# Patient Record
Sex: MALE | Race: WHITE | NOT HISPANIC OR LATINO | Employment: FULL TIME | URBAN - METROPOLITAN AREA
[De-identification: names, ages, dates, MRNs, and addresses within clinical notes are randomized per-mention and may not be internally consistent; named-entity substitution may affect disease eponyms.]

---

## 2017-01-02 ENCOUNTER — HOSPITAL ENCOUNTER (EMERGENCY)
Facility: HOSPITAL | Age: 22
Discharge: HOME | End: 2017-01-02
Attending: EMERGENCY MEDICINE
Payer: MEDICAID

## 2017-01-02 VITALS
HEIGHT: 72 IN | WEIGHT: 167 LBS | SYSTOLIC BLOOD PRESSURE: 128 MMHG | BODY MASS INDEX: 22.62 KG/M2 | TEMPERATURE: 98 F | RESPIRATION RATE: 16 BRPM | HEART RATE: 63 BPM | OXYGEN SATURATION: 98 % | DIASTOLIC BLOOD PRESSURE: 64 MMHG

## 2017-01-02 DIAGNOSIS — L02.91 ABSCESS: Primary | ICD-10-CM

## 2017-01-02 PROCEDURE — 99283 EMERGENCY DEPT VISIT LOW MDM: CPT

## 2017-01-02 RX ORDER — SULFAMETHOXAZOLE AND TRIMETHOPRIM 800; 160 MG/1; MG/1
1 TABLET ORAL 2 TIMES DAILY
Qty: 14 TABLET | Refills: 0 | Status: SHIPPED | OUTPATIENT
Start: 2017-01-02 | End: 2017-01-09

## 2017-01-02 NOTE — ED AVS SNAPSHOT
[facility] [address]  [place] [address]               Emilio Gallagher   2017  8:49 PM   ED    Description:  Male : 1995   Department:  [facility]           Your Care was Coordinated By:     Provider Role From To    Anai Wynn MD Attending Provider 17 --    Lynne Santos PA-C Physician Assistant 17 --      Reason for Visit     Abscess           Diagnoses this Visit        Comments    Abscess    -  Primary       ED Disposition     None           To Do List            These Medications        Disp Refills Start End    mupirocin calcium 2% nasl oint (BACTROBAN) 2 % Oint 10 g 0 2017    by Nasal route 2 (two) times daily. - Nasal    sulfamethoxazole-trimethoprim 800-160mg (BACTRIM DS) 800-160 mg Tab 14 tablet 0 2017    Take 1 tablet by mouth 2 (two) times daily. - Oral      [facility]     [facility] Nurse Care Line -  Assistance  Registered nurses in the [facility] provide clinical advisement, health education, appointment booking, and other advisory services.  Call for this free service at [phone].             Medications           Message regarding Medications     Verify the changes and/or additions to your medication regime listed below are the same as discussed with your clinician today.  If any of these changes or additions are incorrect, please notify your healthcare provider.        START taking these NEW medications        Refills    mupirocin calcium 2% nasl oint (BACTROBAN) 2 % Oint 0    Sig: by Nasal route 2 (two) times daily.    Class: Print    Route: Nasal    sulfamethoxazole-trimethoprim 800-160mg (BACTRIM DS) 800-160 mg Tab 0    Sig: Take 1 tablet by mouth 2 (two) times daily.    Class: Print    Route: Oral           Verify that the below list of medications is an accurate representation of the medications you are currently taking.  If  none reported, the list may be blank. If incorrect, please contact your healthcare provider. Carry this list with you in case of emergency.           Current Medications     mupirocin calcium 2% nasl oint (BACTROBAN) 2 % Oint by Nasal route 2 (two) times daily.    sulfamethoxazole-trimethoprim 800-160mg (BACTRIM DS) 800-160 mg Tab Take 1 tablet by mouth 2 (two) times daily.           Clinical Reference Information           Your Vitals Were     BP Pulse Temp Resp Height Weight    140/66 (BP Location: Right arm, Patient Position: Sitting) 89 98.8 °F (37.1 °C) (Oral) 20 6' (1.829 m) 75.8 kg (167 lb)    SpO2 BMI             98% 22.65 kg/m2         Allergies as of 1/2/2017     No Known Allergies      Immunizations Administered on Date of Encounter - 1/2/2017     None      ED Micro, Lab, POCT     None      ED Imaging Orders     None        Discharge Instructions         Abscess (Antibiotic Treatment Only)  An abscess (sometimes called a boil) occurs when bacteria get trapped under the skin and begin to grow. Pus forms inside the abscess as the body responds to the bacteria. An abscess can occur with an insect bite, ingrown hair, blocked oil gland, pimple, cyst, or puncture wound.  In the early stages, redness and tenderness are the only symptoms. Sometimes, this stage can be treated with antibiotics alone. If the abscess does not respond to antibiotic treatment, it will need to be drained with a small cut, under local anesthesia.  Home care  The following will help you care for your abscess at home:  · Soak the wound in hot water or apply hot packs (small towel soaked in hot water) to the area for 20 minutes at a time. Do this 3 to 4 times a day.  · Do not willy, squeeze, or pop the boil yourself.  · Apply antibiotic cream or ointment onto the skin 3 to 4 times a day, unless something else was prescribed. Some ointments include an antibiotic plus a local pain reliever.  · If your doctor prescribed antibiotics, do not  stop taking this medication until you have finished the medication or the doctor tells you to stop.  · You may use an over-the-counter pain medication to control pain, unless another pain medicine was prescribed. If you have chronic liver or kidney disease or ever had a stomach ulcer or gastrointestinal bleeding, talk with your doctor before using these any of these.  Follow-up care  Follow up with your health care provider as advised by our staff. Look at your wound each day for the signs of worsening infection listed below.  When to seek medical care  Get prompt medical attention if any of the following occur:  · An increase in redness or swelling  · Red streaks in the skin leading away from the abscess  · An increase in local pain or swelling  · Fever of 100.4ºF (38ºC) or higher, or as directed by your health care provider  · Pus or fluid coming from the abscess  · Boil returns after getting better  © 3120-1198 [employer]. [address] [place] [address]. All rights reserved. This information is not intended as a substitute for professional medical care. Always follow your healthcare professional's instructions.          [facility] Sign-Up     Activating your [facility] account is as easy as 1-2-3!     1) Visit [email or web address], select Sign Up Now, enter this activation code and your date of birth, then select Next.  [email or web address]  Expires: 2/16/2017  9:35 PM      2) Create a username and password to use when you visit [facility] in the future and select a security question in case you lose your password and select Next.    3) Enter your e-mail address and click Sign Up!    Additional Information  If you have questions, please e-mail [email or web address] or call [phone] to talk to our [facility] staff. Remember, [facility] is NOT to be used for urgent needs. For medical emergencies, dial 911.         Smoking Cessation     If you would like to quit smoking:   You may be eligible for  free services if you are a [place] resident and started smoking cigarettes before September 1, 1988.  Call the Smoking Cessation Trust (SCT) toll free at (410) 866-7262 or (250) 919-3114.   Call 3-800-QUIT-NOW if you do not meet the above criteria.             [facility] complies with applicable Federal civil rights laws and does not discriminate on the basis of race, color, national origin, age, disability, or sex.        Language Assistance Services     ATTENTION: Language assistance services are available, free of charge. Please call [phone].      ATENCIÓN: Si habla español, tiene a diamond disposición servicios gratuitos de asistencia lingüística. Llame al [phone].     CHÚ Ý: N?u b?n nói Ti?ng Vi?t, có các d?ch v? h? tr? ngôn ng? mi?n phí dành cho b?n. G?i s? [phone].

## 2017-01-03 NOTE — ED PROVIDER NOTES
Encounter Date: 1/2/2017       History     Chief Complaint   Patient presents with    Abscess     noted to left upper leg x 1.5 days      Review of patient's allergies indicates:  No Known Allergies  HPI Comments: Emilio Gallagher, a 21 y.o. male that presents to the ED for abscess to L upper thigh.  He noticed it about 2 days ago and believe it started as an ingrown hair.  He describes the pain as dull and worse with touch.  He states that he was able to express some purulent material last night and since that time the area has seen improved pain, swelling and redness.  He has developed about 8 abscesses over the course of the last two years and generally is able to self treat at home with warm compresses, but is here on the advice of his RN girlfriend to be evaluated.        Patient is a 21 y.o. male presenting with the following complaint: abscess. The history is provided by the patient.   Abscess    This is a new problem. The current episode started two days ago. The problem occurs continuously. The problem has been gradually improving. The abscess is present on the left upper leg. The pain is at a severity of 5/10. The abscess is characterized by redness, draining and swelling. Pertinent negatives include no fever, no diarrhea and no vomiting. There were no sick contacts. He has received no recent medical care.     History reviewed. No pertinent past medical history.  No past medical history pertinent negatives.  Past Surgical History   Procedure Laterality Date    Appendectomy       History reviewed. No pertinent family history.  Social History   Substance Use Topics    Smoking status: Current Every Day Smoker    Smokeless tobacco: None    Alcohol use No     Review of Systems   Constitutional: Negative for fever.   Gastrointestinal: Negative for diarrhea and vomiting.   Musculoskeletal: Negative for gait problem and joint swelling.   Skin: Positive for color change.   Allergic/Immunologic: Negative for  immunocompromised state.   Neurological: Negative for speech difficulty.   Hematological: Does not bruise/bleed easily.   Psychiatric/Behavioral: Negative for agitation and confusion.   All other systems reviewed and are negative.      Physical Exam   Initial Vitals   BP Pulse Resp Temp SpO2   01/02/17 2013 01/02/17 2013 01/02/17 2013 01/02/17 2013 01/02/17 2013   140/66 89 20 98.8 °F (37.1 °C) 98 %     Physical Exam    Nursing note and vitals reviewed.  Constitutional: He appears well-developed and well-nourished.   HENT:   Head: Normocephalic and atraumatic.   Right Ear: External ear normal.   Left Ear: External ear normal.   Nose: Nose normal.   Mouth/Throat: Oropharynx is clear and moist.   Eyes: EOM are normal.   Neck: Normal range of motion. Neck supple.   Cardiovascular: Normal rate and regular rhythm.   Pulmonary/Chest: Breath sounds normal. No respiratory distress.   Abdominal: Soft. He exhibits no distension. There is no tenderness. There is no rebound.   Musculoskeletal: Normal range of motion. He exhibits no edema or tenderness.   Lymphadenopathy:     He has no cervical adenopathy.   Neurological: He is alert and oriented to person, place, and time. No cranial nerve deficit.   Skin: Skin is warm and dry. Abscess noted. No rash noted. No erythema.        Psychiatric: He has a normal mood and affect. Thought content normal.         ED Course   Procedures  Labs Reviewed - No data to display          Medical Decision Making:   Initial Assessment:   Emilio Gallagher, a 21 y.o. male that presents to the ED for abscess to L upper thigh.  He noticed it about 2 days ago and believe it started as an ingrown hair.  He describes the pain as dull and worse with touch.  He states that he was able to express some purulent material last night and since that time the area has seen improved pain, swelling and redness.  He has developed about 8 abscesses over the course of the last two years and generally is able to self  treat at home with warm compresses, but is here on the advice of his RN girlfriend to be evaluated.    Differential Diagnosis:   Abscess, cellulitis   ED Management:  Patient states that the area is improved since his own expression of purulent material last night.  No fluctuance noted on exam to warrant I&D at this time.  Instructed to continue with warm compresses.  Will treat with bactrim.  Given information on use of Mupirocin intranasally and Hibiclens x 1.  Given strict precautions for return to ED and verbalized understanding.  RX: mupirocin, bactrim               Attending Attestation:     Physician Attestation Statement for NP/PA:   I have conducted a face to face encounter with this patient in addition to the NP/PA, due to NP/PA Request    Other NP/PA Attestation Additions:    History of Present Illness: Left thigh abscess, hx of abscess, expressed pus last night and now it is improved    Medical Decision Making: No fluctuance to warrant I&D in ER.  Pus expressed last night.  Will treat with bactrim.                 ED Course     Clinical Impression:   There were no encounter diagnoses.               Lynne Santos PA-C  01/02/17 2148       Anai Wynn MD  01/19/17 1112       Anai Wynn MD  01/19/17 1169

## 2017-01-03 NOTE — DISCHARGE INSTRUCTIONS
Abscess (Antibiotic Treatment Only)  An abscess (sometimes called a boil) occurs when bacteria get trapped under the skin and begin to grow. Pus forms inside the abscess as the body responds to the bacteria. An abscess can occur with an insect bite, ingrown hair, blocked oil gland, pimple, cyst, or puncture wound.  In the early stages, redness and tenderness are the only symptoms. Sometimes, this stage can be treated with antibiotics alone. If the abscess does not respond to antibiotic treatment, it will need to be drained with a small cut, under local anesthesia.  Home care  The following will help you care for your abscess at home:  · Soak the wound in hot water or apply hot packs (small towel soaked in hot water) to the area for 20 minutes at a time. Do this 3 to 4 times a day.  · Do not willy, squeeze, or pop the boil yourself.  · Apply antibiotic cream or ointment onto the skin 3 to 4 times a day, unless something else was prescribed. Some ointments include an antibiotic plus a local pain reliever.  · If your doctor prescribed antibiotics, do not stop taking this medication until you have finished the medication or the doctor tells you to stop.  · You may use an over-the-counter pain medication to control pain, unless another pain medicine was prescribed. If you have chronic liver or kidney disease or ever had a stomach ulcer or gastrointestinal bleeding, talk with your doctor before using these any of these.  Follow-up care  Follow up with your health care provider as advised by our staff. Look at your wound each day for the signs of worsening infection listed below.  When to seek medical care  Get prompt medical attention if any of the following occur:  · An increase in redness or swelling  · Red streaks in the skin leading away from the abscess  · An increase in local pain or swelling  · Fever of 100.4ºF (38ºC) or higher, or as directed by your health care provider  · Pus or fluid coming from the  abscess  · Boil returns after getting better  © 8556-5143 [employer]. [address] [place] [address], [place] [address]. All rights reserved. This information is not intended as a substitute for professional medical care. Always follow your healthcare professional's instructions.

## 2017-01-03 NOTE — ED NOTES
"Abscess to left thigh x 1 day.  Pt "popped" it last pm and it has been draining fluid since.  No fever  "

## 2017-05-10 ENCOUNTER — HOSPITAL ENCOUNTER (EMERGENCY)
Facility: HOSPITAL | Age: 22
Discharge: HOME | End: 2017-05-11
Attending: EMERGENCY MEDICINE
Payer: MEDICAID

## 2017-05-10 DIAGNOSIS — B34.9 VIRAL SYNDROME: Primary | ICD-10-CM

## 2017-05-10 LAB
ANISOCYTOSIS BLD QL SMEAR: SLIGHT
BASOPHILS # BLD AUTO: 0.07 K/UL
BASOPHILS NFR BLD: 0.8 %
DEPRECATED S PYO AG THROAT QL EIA: NEGATIVE
DIFFERENTIAL METHOD: ABNORMAL
EOSINOPHIL # BLD AUTO: 0.3 K/UL
EOSINOPHIL NFR BLD: 2.9 %
ERYTHROCYTE [DISTWIDTH] IN BLOOD BY AUTOMATED COUNT: 14.9 %
HCT VFR BLD AUTO: 41.7 %
HGB BLD-MCNC: 14 G/DL
LYMPHOCYTES # BLD AUTO: 3.2 K/UL
LYMPHOCYTES NFR BLD: 36.1 %
MCH RBC QN AUTO: 19.9 PG
MCHC RBC AUTO-ENTMCNC: 33.6 %
MCV RBC AUTO: 59 FL
MONOCYTES # BLD AUTO: 0.6 K/UL
MONOCYTES NFR BLD: 7.1 %
NEUTROPHILS # BLD AUTO: 4.6 K/UL
NEUTROPHILS NFR BLD: 53.1 %
PLATELET # BLD AUTO: 232 K/UL
PLATELET BLD QL SMEAR: ABNORMAL
PMV BLD AUTO: 9.5 FL
RBC # BLD AUTO: 7.02 M/UL
WBC # BLD AUTO: 8.77 K/UL

## 2017-05-10 PROCEDURE — 85025 COMPLETE CBC W/AUTO DIFF WBC: CPT

## 2017-05-10 PROCEDURE — 80053 COMPREHEN METABOLIC PANEL: CPT

## 2017-05-10 PROCEDURE — 87081 CULTURE SCREEN ONLY: CPT

## 2017-05-10 PROCEDURE — 99283 EMERGENCY DEPT VISIT LOW MDM: CPT

## 2017-05-10 PROCEDURE — 86308 HETEROPHILE ANTIBODY SCREEN: CPT

## 2017-05-10 PROCEDURE — 87880 STREP A ASSAY W/OPTIC: CPT

## 2017-05-10 NOTE — ED AVS SNAPSHOT
[facility] [address]  [place] [address]               Emilio Gallagher   5/10/2017 10:13 PM   ED    Description:  Male : 1995   Department:  [facility]           Your Care was Coordinated By:     Provider Role From To    Kassy Wilhelm MD Attending Provider 05/10/17 9876 --      Reason for Visit     Fatigue           Diagnoses this Visit        Comments    Viral syndrome    -  Primary       ED Disposition     ED Disposition Condition Comment    Discharge             To Do List           Follow-up Information     Please follow up.    Why:  [facility] (Tel: [phone])      [facility]     [facility] Care [facility] -  Assistance  Unless otherwise directed by your provider, please contact [facility], our nurse care line that is available for  assistance.     Registered nurses in the [facility] provide: appointment scheduling, clinical advisement, health education, and other advisory services.  Call: [phone] (toll free)               Medications           Message regarding Medications     Verify the changes and/or additions to your medication regime listed below are the same as discussed with your clinician today.  If any of these changes or additions are incorrect, please notify your healthcare provider.             Verify that the below list of medications is an accurate representation of the medications you are currently taking.  If none reported, the list may be blank. If incorrect, please contact your healthcare provider. Carry this list with you in case of emergency.                Clinical Reference Information           Your Vitals Were     BP Pulse Temp Resp Height Weight    135/78 (BP Location: Right arm, Patient Position: Lying, BP Method: Automatic) 86 99 °F (37.2 °C) 16 6' (1.829 m) 73.9 kg (163 lb)    SpO2 BMI             99% 22.11 kg/m2         Allergies as of 2017   "   No Known Allergies      Immunizations Administered on Date of Encounter - 5/11/2017     None      ED Micro, Lab, POCT     Start Ordered       Status Ordering Provider    05/10/17 2256 05/10/17 2255  Rapid strep screen  STAT      Final result     05/10/17 2255 05/10/17 2255  CBC auto differential  STAT      Final result     05/10/17 2255 05/10/17 2255  Comprehensive metabolic panel  STAT      Final result     05/10/17 2255 05/10/17 2255  Strep A culture, throat  Once      In process     05/10/17 2254 05/10/17 2255  Heterophile Ab Screen  Once      Final result       ED Imaging Orders     None        Discharge Instructions         Viral Syndrome (Adult)  A viral illness may cause a number of symptoms. The symptoms depend on the part of the body that the virus affects. If it settles in your nose, throat, and lungs, it may cause cough, sore throat, congestion, and sometimes headache. If it settles in your stomach and intestinal tract, it may cause vomiting and diarrhea. Sometimes it causes vague symptoms like "aching all over," feeling tired, loss of appetite, or fever.  A viral illness usually lasts 1 to 2 weeks, but sometimes it lasts longer. In some cases, a more serious infection can look like a viral syndrome in the first few days of the illness. You may need another exam and additional tests to know the difference. Watch for the warning signs listed below.  Home care  Follow these guidelines for taking care of yourself at home:  · If symptoms are severe, rest at home for the first 2 to 3 days.  · Stay away from cigarette smoke - both your smoke and the smoke from others.  · You may use over-the-counter acetaminophen or ibuprofen for fever, muscle aching, and headache, unless another medicine was prescribed for this. If you have chronic liver or kidney disease or ever had a stomach ulcer or GI bleeding, talk with your doctor before using these medicines. No one who is younger than 18 and ill with a fever should " take aspirin. It may cause severe disease or death.  · Your appetite may be poor, so a light diet is fine. Avoid dehydration by drinking 8 to 12 8-ounce glasses of fluids each day. This may include water; orange juice; lemonade; apple, grape, and cranberry juice; clear fruit drinks; electrolyte replacement and sports drinks; and decaffeinated teas and coffee. If you have been diagnosed with a kidney disease, ask your doctor how much and what types of fluids you should drink to prevent dehydration. If you have kidney disease, drinking too much fluid can cause it build up in the your body and be dangerous to your health.  · Over-the-counter remedies won't shorten the length of the illness but may be helpful for cough, sore throat; and nasal and sinus congestion. Don't use decongestants if you have high blood pressure.  Follow-up care  Follow up with your healthcare provider if you do not improve over the next week.  Call 911  Get emergency medical care if any of the following occur:  · Convulsion  · Feeling weak, dizzy, or like you are going to faint  · Chest pain, shortness of breath, wheezing, or difficulty breathing  When to seek medical advice  Call your healthcare provider right away if any of these occur:  · Cough with lots of colored sputum (mucus) or blood in your sputum  · Chest pain, shortness of breath, wheezing, or difficulty breathing  · Severe headache; face, neck, or ear pain  · Severe, constant pain in the lower right side of your belly (abdominal)  · Continued vomiting (cant keep liquids down)  · Frequent diarrhea (more than 5 times a day); blood (red or black color) or mucus in diarrhea  · Feeling weak, dizzy, or like you are going to faint  · Extreme thirst  · Fever of 100.4°F (38°C) or higher, or as directed by your healthcare provider  Date Last Reviewed: 9/25/2015 © 2000-2016 [employer]. [address] [place] [address], [place] [address]. All rights reserved. This information is not  intended as a substitute for professional medical care. Always follow your healthcare professional's instructions.          [facility] Sign-Up     Activating your [facility] account is as easy as 1-2-3!     1) Visit [email or web address], select Sign Up Now, enter this activation code and your date of birth, then select Next.  [email or web address]  Expires: 6/25/2017 12:58 AM      2) Create a username and password to use when you visit [facility] in the future and select a security question in case you lose your password and select Next.    3) Enter your e-mail address and click Sign Up!    Additional Information  If you have questions, please e-mail [email or web address] or call [phone] to talk to our [facility] staff. Remember, [facility] is NOT to be used for urgent needs. For medical emergencies, dial 911.         Smoking Cessation     If you would like to quit smoking:   You may be eligible for free services if you are a [place] resident and started smoking cigarettes before September 1, 1988.  Call the Smoking Cessation Trust ([facility]) toll free at (633) 317-0319 or (252) 666-5427.   Call 0-800-QUIT-NOW if you do not meet the above criteria.   Contact us via email: [email or web address]   View our website for more information: [email or web address]         [facility] complies with applicable Federal civil rights laws and does not discriminate on the basis of race, color, national origin, age, disability, or sex.        Language Assistance Services     ATTENTION: Language assistance services are available, free of charge. Please call [phone].      ATENCIÓN: Si habla español, tiene a diamond disposición servicios gratuitos de asistencia lingüística. Llame al [phone].     CHÚ Ý: N?u b?n nói Ti?ng Vi?t, có các d?ch v? h? tr? ngôn ng? mi?n phí dành cho b?n. G?i s? [phone].

## 2017-05-11 VITALS
OXYGEN SATURATION: 100 % | BODY MASS INDEX: 22.08 KG/M2 | TEMPERATURE: 99 F | RESPIRATION RATE: 16 BRPM | SYSTOLIC BLOOD PRESSURE: 131 MMHG | WEIGHT: 163 LBS | HEART RATE: 86 BPM | HEIGHT: 72 IN | DIASTOLIC BLOOD PRESSURE: 77 MMHG

## 2017-05-11 LAB
ALBUMIN SERPL BCP-MCNC: 4.4 G/DL
ALP SERPL-CCNC: 52 U/L
ALT SERPL W/O P-5'-P-CCNC: 42 U/L
ANION GAP SERPL CALC-SCNC: 7 MMOL/L
AST SERPL-CCNC: 22 U/L
BILIRUB SERPL-MCNC: 0.9 MG/DL
BUN SERPL-MCNC: 21 MG/DL
CALCIUM SERPL-MCNC: 9.4 MG/DL
CHLORIDE SERPL-SCNC: 103 MMOL/L
CO2 SERPL-SCNC: 27 MMOL/L
CREAT SERPL-MCNC: 1.4 MG/DL
EST. GFR  (AFRICAN AMERICAN): >60 ML/MIN/1.73 M^2
EST. GFR  (NON AFRICAN AMERICAN): >60 ML/MIN/1.73 M^2
GLUCOSE SERPL-MCNC: 94 MG/DL
HETEROPH AB SERPL QL IA: NEGATIVE
POTASSIUM SERPL-SCNC: 4 MMOL/L
PROT SERPL-MCNC: 7.6 G/DL
SODIUM SERPL-SCNC: 137 MMOL/L

## 2017-05-11 NOTE — ED PROVIDER NOTES
Encounter Date: 5/10/2017       History     Chief Complaint   Patient presents with    Fatigue     Treated with unknown daily antibiotic x 7 days for diagnosed tonsillitis - last antibiotic taken today. Continues with generalized weakness, cough, fever, and headache. No N/V. Presents awake, alert, oriented.      Review of patient's allergies indicates:  No Known Allergies  HPI Comments: The patient presents emergency department with fever heartburn headache and fatigue.  The patient states he still has a persistent mild sore throat as well.  The patient went to an urgent care last week and was given a prescription for Ceftin ear which she has completed.  He states he still doesn't feel better and he still running a fever.  The patient was told at urgent care that he had tonsillitis.    The history is provided by the patient and a relative.     History reviewed. No pertinent past medical history.  Past Surgical History:   Procedure Laterality Date    APPENDECTOMY       History reviewed. No pertinent family history.  Social History   Substance Use Topics    Smoking status: Current Every Day Smoker    Smokeless tobacco: None    Alcohol use No     Review of Systems   Constitutional: Positive for activity change, appetite change, fatigue and fever.   HENT: Positive for sore throat. Negative for sinus pressure.    Respiratory: Negative for cough and shortness of breath.    Cardiovascular: Negative for chest pain.   Gastrointestinal: Negative for abdominal pain, diarrhea, nausea and vomiting.   Genitourinary: Negative for dysuria, flank pain and frequency.   Musculoskeletal: Negative for back pain, myalgias, neck pain and neck stiffness.   Skin: Negative for rash.   Neurological: Positive for weakness. Negative for headaches.   Hematological: Does not bruise/bleed easily.   Psychiatric/Behavioral: Negative for confusion.       Physical Exam   Initial Vitals   BP Pulse Resp Temp SpO2   05/10/17 2036 05/10/17 2036  05/10/17 2036 05/10/17 2036 05/10/17 2036   132/76 99 16 98.5 °F (36.9 °C) 99 %     Physical Exam    Nursing note and vitals reviewed.  Constitutional: He appears well-developed and well-nourished.   HENT:   Head: Normocephalic and atraumatic.   Right Ear: Tympanic membrane is injected and erythematous.   Left Ear: Tympanic membrane is injected and erythematous.   Mouth/Throat: Oropharynx is clear and moist.   Eyes: EOM are normal. Pupils are equal, round, and reactive to light.   Neck: Normal range of motion. Neck supple.   Cardiovascular: Normal rate, regular rhythm, normal heart sounds and intact distal pulses.   Pulmonary/Chest: Breath sounds normal.   Abdominal: Soft. Bowel sounds are normal. He exhibits no distension. There is no tenderness. There is no rebound and no guarding.   Musculoskeletal: Normal range of motion. He exhibits no edema.   Neurological: He is alert and oriented to person, place, and time.   Skin: Skin is warm and dry.   Psychiatric: He has a normal mood and affect. His behavior is normal. Judgment and thought content normal.         ED Course   Procedures  Labs Reviewed   THROAT SCREEN, RAPID   HETEROPHILE AB SCREEN   CBC W/ AUTO DIFFERENTIAL   COMPREHENSIVE METABOLIC PANEL             Medical Decision Making:   Clinical Tests:   Lab Tests: Ordered and Reviewed  The following lab test(s) were unremarkable: CBC and CMP       <> Summary of Lab: Monospot, strep  ED Management:  I discussed the results of the patient's labs with him.  The patient was on Cefdinir for a week.  This should treat strep and otitis media if bacterial.  I discussed starting the patient on a different regimen of antibiotics and he prefers to wait and see if he feels better in a few days.  I recommend that he drinks lots of liquids and take vitamins.                   ED Course     Clinical Impression:   The encounter diagnosis was Viral syndrome.          Kassy Wilhelm MD  05/11/17 0059

## 2017-05-11 NOTE — DISCHARGE INSTRUCTIONS
"  Viral Syndrome (Adult)  A viral illness may cause a number of symptoms. The symptoms depend on the part of the body that the virus affects. If it settles in your nose, throat, and lungs, it may cause cough, sore throat, congestion, and sometimes headache. If it settles in your stomach and intestinal tract, it may cause vomiting and diarrhea. Sometimes it causes vague symptoms like "aching all over," feeling tired, loss of appetite, or fever.  A viral illness usually lasts 1 to 2 weeks, but sometimes it lasts longer. In some cases, a more serious infection can look like a viral syndrome in the first few days of the illness. You may need another exam and additional tests to know the difference. Watch for the warning signs listed below.  Home care  Follow these guidelines for taking care of yourself at home:  · If symptoms are severe, rest at home for the first 2 to 3 days.  · Stay away from cigarette smoke - both your smoke and the smoke from others.  · You may use over-the-counter acetaminophen or ibuprofen for fever, muscle aching, and headache, unless another medicine was prescribed for this. If you have chronic liver or kidney disease or ever had a stomach ulcer or GI bleeding, talk with your doctor before using these medicines. No one who is younger than 18 and ill with a fever should take aspirin. It may cause severe disease or death.  · Your appetite may be poor, so a light diet is fine. Avoid dehydration by drinking 8 to 12 8-ounce glasses of fluids each day. This may include water; orange juice; lemonade; apple, grape, and cranberry juice; clear fruit drinks; electrolyte replacement and sports drinks; and decaffeinated teas and coffee. If you have been diagnosed with a kidney disease, ask your doctor how much and what types of fluids you should drink to prevent dehydration. If you have kidney disease, drinking too much fluid can cause it build up in the your body and be dangerous to your " health.  · Over-the-counter remedies won't shorten the length of the illness but may be helpful for cough, sore throat; and nasal and sinus congestion. Don't use decongestants if you have high blood pressure.  Follow-up care  Follow up with your healthcare provider if you do not improve over the next week.  Call 911  Get emergency medical care if any of the following occur:  · Convulsion  · Feeling weak, dizzy, or like you are going to faint  · Chest pain, shortness of breath, wheezing, or difficulty breathing  When to seek medical advice  Call your healthcare provider right away if any of these occur:  · Cough with lots of colored sputum (mucus) or blood in your sputum  · Chest pain, shortness of breath, wheezing, or difficulty breathing  · Severe headache; face, neck, or ear pain  · Severe, constant pain in the lower right side of your belly (abdominal)  · Continued vomiting (cant keep liquids down)  · Frequent diarrhea (more than 5 times a day); blood (red or black color) or mucus in diarrhea  · Feeling weak, dizzy, or like you are going to faint  · Extreme thirst  · Fever of 100.4°F (38°C) or higher, or as directed by your healthcare provider  Date Last Reviewed: 9/25/2015  © 2065-8233 [employer]. [address] [place] [address], [place] [address]. All rights reserved. This information is not intended as a substitute for professional medical care. Always follow your healthcare professional's instructions.

## 2017-05-13 LAB — BACTERIA THROAT CULT: NORMAL

## 2017-11-15 ENCOUNTER — OFFICE VISIT (OUTPATIENT)
Dept: URGENT CARE | Facility: CLINIC | Age: 22
End: 2017-11-15
Payer: MEDICAID

## 2017-11-15 VITALS
DIASTOLIC BLOOD PRESSURE: 80 MMHG | OXYGEN SATURATION: 99 % | RESPIRATION RATE: 18 BRPM | HEART RATE: 80 BPM | WEIGHT: 165 LBS | SYSTOLIC BLOOD PRESSURE: 125 MMHG | HEIGHT: 70 IN | TEMPERATURE: 98 F | BODY MASS INDEX: 23.62 KG/M2

## 2017-11-15 DIAGNOSIS — L23.7 POISON IVY DERMATITIS: Primary | ICD-10-CM

## 2017-11-15 DIAGNOSIS — H00.021 HORDEOLUM INTERNUM OF RIGHT UPPER EYELID: ICD-10-CM

## 2017-11-15 PROCEDURE — 99203 OFFICE O/P NEW LOW 30 MIN: CPT | Mod: S$GLB,,, | Performed by: PHYSICIAN ASSISTANT

## 2017-11-15 RX ORDER — METHYLPREDNISOLONE 4 MG/1
4 TABLET ORAL
Qty: 1 PACKAGE | Refills: 0 | Status: SHIPPED | OUTPATIENT
Start: 2017-11-15 | End: 2017-11-21

## 2017-11-15 RX ORDER — CLOBETASOL PROPIONATE 0.5 MG/G
CREAM TOPICAL 2 TIMES DAILY
Qty: 60 G | Refills: 1 | Status: SHIPPED | OUTPATIENT
Start: 2017-11-15 | End: 2017-11-25

## 2017-11-15 RX ORDER — NEOMYCIN/POLYMYXIN B/HYDROCORT 3.5-10K-1
2 SUSPENSION, DROPS(FINAL DOSAGE FORM)(ML) OPHTHALMIC (EYE) EVERY 4 HOURS
Qty: 7.5 ML | Refills: 0 | Status: SHIPPED | OUTPATIENT
Start: 2017-11-15

## 2017-11-15 NOTE — PATIENT INSTRUCTIONS
Managing a Poison Ivy, Poison Oak, or Poison Sumac Reaction  If you come in contact with urushiol    If you think you may have come in contact with the sap oil (called urushiol) contained in poison ivy, poison oak, or poison sumac plants, wash the affected part of your skin. Do this within 15 minutes after contact. Use water or preferably, soap and water.  Undress, and wash your clothes and gear as soon as you can. Be sure to wash any pet that was with you. Taking these steps can help prevent spreading sap oil to someone else. If you have a rash, but are not sure if it is from one of these plants, see your healthcare provider.  To soothe the itching  Your skin may react to poison oak, poison ivy, and poison sumac within hours to a few days after contact. Once you have come into contact with these plants, you cant stop the reaction. But you can take these steps to soothe the itching:  · Dont scratch or scrub your rash. Not even if the itching is severe. Scratching can lead to infection.  · Bathe in lukewarm (not hot) water. Or take short cool showers to relieve the itching. For a more soothing bath, add oatmeal to the water.  · Use antihistamines that are taken by mouth. These include diphenhydramine. You can buy these at the pharmacy. Talk to your healthcare provider or pharmacist for more information on oral antihistamines.  · Use over-the-counter treatments on your skin. These include cortisone and calamine lotion.  How your skin may react  A mild rash may become red, swollen, and itchy. The rash may form a line on your skin where you brushed against the plant. If you have a severe rash, your itching may worsen. And your rash may blister and ooze. If this happens, seek medical care. The fluid from your blisters will not make your rash spread. With or without medical care, your rash may last up to 3 weeks. In the future, try to avoid coming in contact with these plants.  When to call your healthcare  provider  Call your healthcare provider if:  · Your rash is severe  · The rash spreads beyond the exposed part of your body or affects your face.  · The rash does not clear up within a few weeks  You may be given medicine to take by mouth or apply directly on the skin.  Call 911  Call 911 if you have any of the following:  · Trouble breathing or swallowing  · Any significant swelling  Date Last Reviewed: 3/1/2017  © 6751-9042 [employer]. [address] [place] [address], [place] [address]. All rights reserved. This information is not intended as a substitute for professional medical care. Always follow your healthcare professional's instructions.        Sty (or Stye)  A sty is an infection of the oil gland of the eyelid. It may develop into a small pocket of pus (abscess). This can cause pain, redness, and swelling. In early stages, styes are treated with antibiotic cream, eye drops, or warm packs (small towels soaked in warm water). More severe cases may need to be opened and drained by a health care provider.  Home care  · Eye drops or ointment are usually prescribed to treat the infection. Use these as directed.   ¨ Artificial tears may also be used to lubricate the eye and make it more comfortable. These may be purchased without a prescription.   ¨ Talk to your health care provider before using any over-the-counter treatment for a sty.  · Apply a warm, damp towel to the affected eye for at least 5 minutes, 3 to 4 times a day for a week. Warm compresses open the pores and speed the healing. If the compresses are too hot, they may burn your eyelid.  · Sometimes the sty will drain with this treatment alone. If this happens, continue the antibiotic until all the redness and swelling are gone.  · Wash your hands before and after touching the infected eye to avoid spreading the infection.  · Do not squeeze or try to puncture the sty.  Follow-up care  Follow up with your health care provider, or as advised.   When  to seek medical advice  Call your health care provider right away if you have:  · Increase in swelling or redness around the eyelid after 48 to 72 hours  · Increase in eye pain or the eyelid blisters  · Increase in warmth--the eyelid feels hot  · Drainage of blood or thick pus from the sty  · Blister on the eyelid  · Inability to open the eyelid due to swelling  · Fever  ¨ 1 degree above your normal temperature lasting for 24 to 48 hours, or  ¨ Whatever your health care provider told you to report based on your medical condition  · Vision changes  · Headache or stiff neck  · Recurrence of the sty  Date Last Reviewed: 6/14/2015  © 8196-0965 [employer]. [address] [place] [address], [place] [address]. All rights reserved. This information is not intended as a substitute for professional medical care. Always follow your healthcare professional's instructions.      Please follow up with your Primary care provider within 2-5 days if your signs and symptoms have not resolved or worsen.     If your condition worsens or fails to improve we recommend that you receive another evaluation at the emergency room immediately or contact your primary medical clinic to discuss your concerns.   You must understand that you have received an Urgent Care treatment only and that you may be released before all of your medical problems are known or treated. You, the patient, will arrange for follow up care as instructed.

## 2017-11-15 NOTE — PROGRESS NOTES
"Subjective:       Patient ID: Emilio Gallagher is a 22 y.o. male.    Vitals:  height is 5' 10" (1.778 m) and weight is 74.8 kg (165 lb). His oral temperature is 97.5 °F (36.4 °C). His blood pressure is 125/80 and his pulse is 80. His respiration is 18 and oxygen saturation is 99%.     Chief Complaint: Rash    Rash   This is a new problem. The current episode started in the past 7 days. The rash is characterized by itchiness and redness. It is unknown if there was an exposure to a precipitant. Pertinent negatives include no fever, joint pain, shortness of breath or sore throat.   Eye Problem    The right eye is affected. This is a new problem. The current episode started in the past 7 days. The injury mechanism is unknown. The pain is at a severity of 0/10. The patient is experiencing no pain. There is no known exposure to pink eye. He does not wear contacts. Pertinent negatives include no fever. He has tried nothing for the symptoms.     Review of Systems   Constitution: Negative for chills and fever.   HENT: Negative for sore throat.    Respiratory: Negative for shortness of breath.    Skin: Positive for itching and rash.   Musculoskeletal: Negative for joint pain.       Objective:      Physical Exam   Constitutional: He is oriented to person, place, and time. He appears well-developed and well-nourished. He is cooperative.  Non-toxic appearance. He does not appear ill. No distress.   HENT:   Head: Normocephalic and atraumatic. Head is without abrasion, without contusion and without laceration.   Right Ear: Hearing, tympanic membrane, external ear and ear canal normal.   Left Ear: Hearing, tympanic membrane, external ear and ear canal normal.   Nose: Nose normal. No mucosal edema, rhinorrhea or nasal deformity. No epistaxis. Right sinus exhibits no maxillary sinus tenderness and no frontal sinus tenderness. Left sinus exhibits no maxillary sinus tenderness and no frontal sinus tenderness.   Mouth/Throat: Uvula is " midline, oropharynx is clear and moist and mucous membranes are normal. No trismus in the jaw. Normal dentition. No uvula swelling. No posterior oropharyngeal erythema.   Eyes: Conjunctivae, EOM and lids are normal. Pupils are equal, round, and reactive to light. Lids are everted and swept, no foreign bodies found. Right eye exhibits hordeolum. Right eye exhibits no chemosis, no discharge and no exudate. No foreign body present in the right eye. Left eye exhibits no chemosis, no discharge, no exudate and no hordeolum. No foreign body present in the left eye. No scleral icterus.       Sclera clear bilat   Neck: Trachea normal, full passive range of motion without pain and phonation normal. Neck supple.   Cardiovascular: Normal rate, regular rhythm, normal heart sounds, intact distal pulses and normal pulses.    Pulmonary/Chest: Effort normal and breath sounds normal. No stridor. No respiratory distress.   Abdominal: Soft. Normal appearance and bowel sounds are normal. He exhibits no distension. There is no tenderness.   Musculoskeletal: Normal range of motion. He exhibits no edema or deformity.   Neurological: He is alert and oriented to person, place, and time. He exhibits normal muscle tone. Coordination normal.   Skin: Skin is warm, dry and intact. Capillary refill takes less than 2 seconds. Rash noted. No abrasion, no bruising, no burn, no ecchymosis, no laceration and no lesion noted. Rash is maculopapular and vesicular. He is not diaphoretic. No erythema. No pallor.        Vesicular oozing maculopapular rash noted to B forearms   Psychiatric: He has a normal mood and affect. His speech is normal and behavior is normal. Judgment and thought content normal. Cognition and memory are normal.   Nursing note and vitals reviewed.      Assessment:       1. Poison ivy dermatitis    2. Hordeolum internum of right upper eyelid        Plan:         Poison ivy dermatitis  -     clobetasol (TEMOVATE) 0.05 % cream; Apply  topically 2 (two) times daily.  Dispense: 60 g; Refill: 1  -     methylPREDNISolone (MEDROL DOSEPACK) 4 mg tablet; Take 1 tablet (4 mg total) by mouth as needed (Take as directed). Take as directed  Dispense: 1 Package; Refill: 0    Hordeolum internum of right upper eyelid  -     neomycin-polymyxin-hydrocortisone (CORTISPORIN) 3.5-10,000-10 mg-unit-mg/mL ophthalmic suspension; Place 2 drops into the right eye every 4 (four) hours.  Dispense: 7.5 mL; Refill: 0        Managing a Poison Ivy, Poison Oak, or Poison Sumac Reaction  If you come in contact with urushiol    If you think you may have come in contact with the sap oil (called urushiol) contained in poison ivy, poison oak, or poison sumac plants, wash the affected part of your skin. Do this within 15 minutes after contact. Use water or preferably, soap and water.  Undress, and wash your clothes and gear as soon as you can. Be sure to wash any pet that was with you. Taking these steps can help prevent spreading sap oil to someone else. If you have a rash, but are not sure if it is from one of these plants, see your healthcare provider.  To soothe the itching  Your skin may react to poison oak, poison ivy, and poison sumac within hours to a few days after contact. Once you have come into contact with these plants, you cant stop the reaction. But you can take these steps to soothe the itching:  · Dont scratch or scrub your rash. Not even if the itching is severe. Scratching can lead to infection.  · Bathe in lukewarm (not hot) water. Or take short cool showers to relieve the itching. For a more soothing bath, add oatmeal to the water.  · Use antihistamines that are taken by mouth. These include diphenhydramine. You can buy these at the pharmacy. Talk to your healthcare provider or pharmacist for more information on oral antihistamines.  · Use over-the-counter treatments on your skin. These include cortisone and calamine lotion.  How your skin may react  A mild  rash may become red, swollen, and itchy. The rash may form a line on your skin where you brushed against the plant. If you have a severe rash, your itching may worsen. And your rash may blister and ooze. If this happens, seek medical care. The fluid from your blisters will not make your rash spread. With or without medical care, your rash may last up to 3 weeks. In the future, try to avoid coming in contact with these plants.  When to call your healthcare provider  Call your healthcare provider if:  · Your rash is severe  · The rash spreads beyond the exposed part of your body or affects your face.  · The rash does not clear up within a few weeks  You may be given medicine to take by mouth or apply directly on the skin.  Call 911  Call 911 if you have any of the following:  · Trouble breathing or swallowing  · Any significant swelling  Date Last Reviewed: 3/1/2017  © 2434-5284 [employer]. [address] [place] [address]. All rights reserved. This information is not intended as a substitute for professional medical care. Always follow your healthcare professional's instructions.        Sty (or Stye)  A sty is an infection of the oil gland of the eyelid. It may develop into a small pocket of pus (abscess). This can cause pain, redness, and swelling. In early stages, styes are treated with antibiotic cream, eye drops, or warm packs (small towels soaked in warm water). More severe cases may need to be opened and drained by a health care provider.  Home care  · Eye drops or ointment are usually prescribed to treat the infection. Use these as directed.   ¨ Artificial tears may also be used to lubricate the eye and make it more comfortable. These may be purchased without a prescription.   ¨ Talk to your health care provider before using any over-the-counter treatment for a sty.  · Apply a warm, damp towel to the affected eye for at least 5 minutes, 3 to 4 times a day for a week. Warm compresses  open the pores and speed the healing. If the compresses are too hot, they may burn your eyelid.  · Sometimes the sty will drain with this treatment alone. If this happens, continue the antibiotic until all the redness and swelling are gone.  · Wash your hands before and after touching the infected eye to avoid spreading the infection.  · Do not squeeze or try to puncture the sty.  Follow-up care  Follow up with your health care provider, or as advised.   When to seek medical advice  Call your health care provider right away if you have:  · Increase in swelling or redness around the eyelid after 48 to 72 hours  · Increase in eye pain or the eyelid blisters  · Increase in warmth--the eyelid feels hot  · Drainage of blood or thick pus from the sty  · Blister on the eyelid  · Inability to open the eyelid due to swelling  · Fever  ¨ 1 degree above your normal temperature lasting for 24 to 48 hours, or  ¨ Whatever your health care provider told you to report based on your medical condition  · Vision changes  · Headache or stiff neck  · Recurrence of the sty  Date Last Reviewed: 6/14/2015  © 2100-9434 [employer]. [address] [place] [address]. All rights reserved. This information is not intended as a substitute for professional medical care. Always follow your healthcare professional's instructions.      Please follow up with your Primary care provider within 2-5 days if your signs and symptoms have not resolved or worsen.     If your condition worsens or fails to improve we recommend that you receive another evaluation at the emergency room immediately or contact your primary medical clinic to discuss your concerns.   You must understand that you have received an Urgent Care treatment only and that you may be released before all of your medical problems are known or treated. You, the patient, will arrange for follow up care as instructed.

## 2018-04-03 ENCOUNTER — HOSPITAL ENCOUNTER (EMERGENCY)
Facility: HOSPITAL | Age: 23
Discharge: HOME | End: 2018-04-03
Attending: EMERGENCY MEDICINE
Payer: MEDICAID

## 2018-04-03 VITALS
HEIGHT: 69 IN | DIASTOLIC BLOOD PRESSURE: 72 MMHG | OXYGEN SATURATION: 97 % | BODY MASS INDEX: 23.7 KG/M2 | WEIGHT: 160 LBS | HEART RATE: 78 BPM | SYSTOLIC BLOOD PRESSURE: 129 MMHG | RESPIRATION RATE: 18 BRPM | TEMPERATURE: 98 F

## 2018-04-03 DIAGNOSIS — M25.512 LEFT SHOULDER PAIN: ICD-10-CM

## 2018-04-03 PROCEDURE — 99283 EMERGENCY DEPT VISIT LOW MDM: CPT | Mod: 25

## 2018-04-03 PROCEDURE — 96372 THER/PROPH/DIAG INJ SC/IM: CPT

## 2018-04-03 PROCEDURE — 63600175 PHARM REV CODE 636 W HCPCS: Performed by: PHYSICIAN ASSISTANT

## 2018-04-03 RX ORDER — NAPROXEN 500 MG/1
500 TABLET ORAL 2 TIMES DAILY WITH MEALS
Qty: 14 TABLET | Refills: 0 | Status: SHIPPED | OUTPATIENT
Start: 2018-04-03

## 2018-04-03 RX ORDER — KETOROLAC TROMETHAMINE 30 MG/ML
30 INJECTION, SOLUTION INTRAMUSCULAR; INTRAVENOUS
Status: COMPLETED | OUTPATIENT
Start: 2018-04-03 | End: 2018-04-03

## 2018-04-03 RX ORDER — METHOCARBAMOL 750 MG/1
1500 TABLET, FILM COATED ORAL 3 TIMES DAILY
Qty: 30 TABLET | Refills: 0 | Status: SHIPPED | OUTPATIENT
Start: 2018-04-03 | End: 2018-04-08

## 2018-04-03 RX ADMIN — KETOROLAC TROMETHAMINE 30 MG: 30 INJECTION, SOLUTION INTRAMUSCULAR at 09:04
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
## 2018-04-03 NOTE — DISCHARGE INSTRUCTIONS
You have been prescribed robaxin for pain. Please do not take this medication while working, drinking alcohol, swimming, or while driving/operating heavy machinery. This medication may cause drowsiness, impair judgment, and reduce physical capabilities.    You have been prescribed Naproxen for pain. This is an Non-Steroidal Anti-Inflammatory (NSAID) Medication. Please do not take any additional NSAIDs while you are taking this medication including (Advil, Aleve, Motrin, Ibuprofen, Mobic\meloxicam, Naprosyn, etc.). Please stop taking this medication if you experience: weakness, itching, yellow skin or eyes, joint pains, vomiting blood, blood or black stools, unusual weight gain, or swelling in your arms, legs, hands, or feet.
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
## 2019-01-22 ENCOUNTER — OFFICE VISIT (OUTPATIENT)
Dept: URGENT CARE | Facility: CLINIC | Age: 24
End: 2019-01-22
Payer: MEDICAID

## 2019-01-22 VITALS
SYSTOLIC BLOOD PRESSURE: 123 MMHG | HEIGHT: 69 IN | OXYGEN SATURATION: 98 % | WEIGHT: 160 LBS | RESPIRATION RATE: 18 BRPM | BODY MASS INDEX: 23.7 KG/M2 | HEART RATE: 98 BPM | TEMPERATURE: 99 F | DIASTOLIC BLOOD PRESSURE: 79 MMHG

## 2019-01-22 DIAGNOSIS — J06.9 UPPER RESPIRATORY TRACT INFECTION, UNSPECIFIED TYPE: Primary | ICD-10-CM

## 2019-01-22 PROCEDURE — 99214 OFFICE O/P EST MOD 30 MIN: CPT | Mod: S$GLB,,, | Performed by: PHYSICIAN ASSISTANT

## 2019-01-22 PROCEDURE — 99214 PR OFFICE/OUTPT VISIT, EST, LEVL IV, 30-39 MIN: ICD-10-PCS | Mod: S$GLB,,, | Performed by: PHYSICIAN ASSISTANT

## 2019-01-22 RX ORDER — BENZONATATE 100 MG/1
100 CAPSULE ORAL 3 TIMES DAILY PRN
Qty: 30 CAPSULE | Refills: 0 | Status: SHIPPED | OUTPATIENT
Start: 2019-01-22 | End: 2020-01-22

## 2019-01-22 RX ORDER — PROMETHAZINE HYDROCHLORIDE AND DEXTROMETHORPHAN HYDROBROMIDE 6.25; 15 MG/5ML; MG/5ML
5 SYRUP ORAL NIGHTLY PRN
Qty: 118 ML | Refills: 0 | Status: SHIPPED | OUTPATIENT
Start: 2019-01-22

## 2019-01-22 NOTE — PROGRESS NOTES
"Subjective:       Patient ID: Emilio Gallagher is a 24 y.o. male.    Vitals:  height is 5' 9" (1.753 m) and weight is 72.6 kg (160 lb). His temperature is 98.9 °F (37.2 °C). His blood pressure is 123/79 and his pulse is 98. His respiration is 18 and oxygen saturation is 98%.     Chief Complaint: Cough    Cough   This is a new problem. The current episode started yesterday. The problem has been unchanged. The problem occurs constantly. The cough is productive of sputum. Associated symptoms include a fever. Pertinent negatives include no chills, ear pain, eye redness, hemoptysis, myalgias, rash, sore throat, shortness of breath or wheezing. Nothing aggravates the symptoms. Treatments tried: tylenol sinus. The treatment provided mild relief.       Constitution: Positive for sweating, fatigue and fever. Negative for chills.   HENT: Positive for congestion. Negative for ear pain, sinus pain, sinus pressure, sore throat and voice change.    Neck: Negative for painful lymph nodes.   Eyes: Negative for eye redness.   Respiratory: Positive for cough and sputum production. Negative for chest tightness, bloody sputum, COPD, shortness of breath, stridor, wheezing and asthma.    Gastrointestinal: Positive for diarrhea. Negative for nausea and vomiting.   Musculoskeletal: Negative for muscle ache.   Skin: Negative for rash and erythema.   Allergic/Immunologic: Negative for seasonal allergies and asthma.   Hematologic/Lymphatic: Negative for swollen lymph nodes.       Objective:      Physical Exam   Constitutional: He is oriented to person, place, and time. Vital signs are normal. He appears well-developed and well-nourished. He does not appear ill. No distress.   HENT:   Head: Normocephalic and atraumatic.   Right Ear: Hearing, tympanic membrane, external ear and ear canal normal.   Left Ear: Hearing, tympanic membrane, external ear and ear canal normal.   Nose: Mucosal edema present. Right sinus exhibits no maxillary sinus " tenderness and no frontal sinus tenderness. Left sinus exhibits no maxillary sinus tenderness and no frontal sinus tenderness.   Mouth/Throat: Uvula is midline. Posterior oropharyngeal erythema present. No oropharyngeal exudate or posterior oropharyngeal edema.   Eyes: Conjunctivae, EOM and lids are normal. Right eye exhibits no discharge. Left eye exhibits no discharge.   Neck: Normal range of motion. Neck supple.   Cardiovascular: Normal rate, regular rhythm and normal heart sounds. Exam reveals no gallop and no friction rub.   No murmur heard.  Pulmonary/Chest: Effort normal and breath sounds normal. No respiratory distress. He has no decreased breath sounds. He has no wheezes. He has no rhonchi. He has no rales.   Musculoskeletal: Normal range of motion.   Lymphadenopathy:        Head (right side): No submandibular and no tonsillar adenopathy present.        Head (left side): No submandibular and no tonsillar adenopathy present.   Neurological: He is alert and oriented to person, place, and time.   Skin: Skin is warm and dry. No rash noted. No erythema.   Psychiatric: He has a normal mood and affect. His behavior is normal.   Nursing note and vitals reviewed.      Assessment:       1. Upper respiratory tract infection, unspecified type        Plan:         Upper respiratory tract infection, unspecified type  -     benzonatate (TESSALON PERLES) 100 MG capsule; Take 1 capsule (100 mg total) by mouth 3 (three) times daily as needed for Cough.  Dispense: 30 capsule; Refill: 0  -     promethazine-dextromethorphan (PROMETHAZINE-DM) 6.25-15 mg/5 mL Syrp; Take 5 mLs by mouth nightly as needed.  Dispense: 118 mL; Refill: 0      Patient Instructions   -Take tessalon perles during the day and cough syrup at night as needed. Be aware the cough syrup may cause drowsiness.    Below are suggestions for symptomatic relief:   -Tylenol every 4 hours OR ibuprofen every 6 hours as needed for pain/fever.   -Salt water gargles to  soothe throat pain.   -Chloroseptic spray also helps to numb throat pain.   -Nasal saline spray reduces inflammation and dryness.   -Warm face compresses to help with facial sinus pain/pressure.   -Vicks vapor rub at night.   -Flonase OTC or Nasacort OTC for nasal congestion.   -Simple foods like chicken noodle soup.   -Delsym helps with coughing at night   -Zyrtec/Claritin during the day & Benadryl at night may help with allergies.     If you DO NOT have Hypertension or any history of palpitations, it is ok to take over the counter Sudafed or Mucinex D or Allegra-D or Claritin-D or Zyrtec-D.  If you do take one of the above, it is ok to combine that with plain over the counter Mucinex or Allegra or Claritin or Zyrtec. If, for example, you are taking Zyrtec -D, you can combine that with Mucinex, but not Mucinex-D.  If you are taking Mucinex-D, you can combine that with plain Allegra or Claritin or Zyrtec.   If you DO have Hypertension or palpitations, it is safe to take Coricidin HBP for relief of sinus symptoms.    Please follow up with your primary care provider within 2-5 days if your signs and symptoms have not resolved or worsen.     If your condition worsens or fails to improve we recommend that you receive another evaluation at the emergency room immediately or contact your primary medical clinic to discuss your concerns.   You must understand that you have received an Urgent Care treatment only and that you may be released before all of your medical problems are known or treated. You, the patient, will arrange for follow up care as instructed.

## 2019-01-22 NOTE — PATIENT INSTRUCTIONS
-Take tessalon perles during the day and cough syrup at night as needed. Be aware the cough syrup may cause drowsiness.    Below are suggestions for symptomatic relief:   -Tylenol every 4 hours OR ibuprofen every 6 hours as needed for pain/fever.   -Salt water gargles to soothe throat pain.   -Chloroseptic spray also helps to numb throat pain.   -Nasal saline spray reduces inflammation and dryness.   -Warm face compresses to help with facial sinus pain/pressure.   -Vicks vapor rub at night.   -Flonase OTC or Nasacort OTC for nasal congestion.   -Simple foods like chicken noodle soup.   -Delsym helps with coughing at night   -Zyrtec/Claritin during the day & Benadryl at night may help with allergies.     If you DO NOT have Hypertension or any history of palpitations, it is ok to take over the counter Sudafed or Mucinex D or Allegra-D or Claritin-D or Zyrtec-D.  If you do take one of the above, it is ok to combine that with plain over the counter Mucinex or Allegra or Claritin or Zyrtec. If, for example, you are taking Zyrtec -D, you can combine that with Mucinex, but not Mucinex-D.  If you are taking Mucinex-D, you can combine that with plain Allegra or Claritin or Zyrtec.   If you DO have Hypertension or palpitations, it is safe to take Coricidin HBP for relief of sinus symptoms.    Please follow up with your primary care provider within 2-5 days if your signs and symptoms have not resolved or worsen.     If your condition worsens or fails to improve we recommend that you receive another evaluation at the emergency room immediately or contact your primary medical clinic to discuss your concerns.   You must understand that you have received an Urgent Care treatment only and that you may be released before all of your medical problems are known or treated. You, the patient, will arrange for follow up care as instructed.

## 2023-02-26 ENCOUNTER — OFFICE VISIT (OUTPATIENT)
Dept: URGENT CARE | Facility: CLINIC | Age: 28
End: 2023-02-26
Payer: COMMERCIAL

## 2023-02-26 VITALS
OXYGEN SATURATION: 98 % | HEIGHT: 69 IN | BODY MASS INDEX: 28.44 KG/M2 | TEMPERATURE: 98 F | SYSTOLIC BLOOD PRESSURE: 122 MMHG | HEART RATE: 92 BPM | RESPIRATION RATE: 17 BRPM | DIASTOLIC BLOOD PRESSURE: 82 MMHG | WEIGHT: 192 LBS

## 2023-02-26 DIAGNOSIS — J06.9 UPPER RESPIRATORY TRACT INFECTION, UNSPECIFIED TYPE: Primary | ICD-10-CM

## 2023-02-26 DIAGNOSIS — R05.9 COUGH, UNSPECIFIED TYPE: ICD-10-CM

## 2023-02-26 DIAGNOSIS — J02.9 SORE THROAT: ICD-10-CM

## 2023-02-26 LAB
CTP QC/QA: YES
MOLECULAR STREP A: NEGATIVE
POC MOLECULAR INFLUENZA A AGN: NEGATIVE
POC MOLECULAR INFLUENZA B AGN: NEGATIVE
SARS-COV-2 AG RESP QL IA.RAPID: NEGATIVE

## 2023-02-26 PROCEDURE — 87502 INFLUENZA DNA AMP PROBE: CPT | Mod: QW,S$GLB,, | Performed by: NURSE PRACTITIONER

## 2023-02-26 PROCEDURE — 87811 SARS-COV-2 COVID19 W/OPTIC: CPT | Mod: QW,S$GLB,, | Performed by: NURSE PRACTITIONER

## 2023-02-26 PROCEDURE — 3008F BODY MASS INDEX DOCD: CPT | Mod: CPTII,S$GLB,, | Performed by: NURSE PRACTITIONER

## 2023-02-26 PROCEDURE — 87811 SARS CORONAVIRUS 2 ANTIGEN POCT, MANUAL READ: ICD-10-PCS | Mod: QW,S$GLB,, | Performed by: NURSE PRACTITIONER

## 2023-02-26 PROCEDURE — 3079F DIAST BP 80-89 MM HG: CPT | Mod: CPTII,S$GLB,, | Performed by: NURSE PRACTITIONER

## 2023-02-26 PROCEDURE — 3074F PR MOST RECENT SYSTOLIC BLOOD PRESSURE < 130 MM HG: ICD-10-PCS | Mod: CPTII,S$GLB,, | Performed by: NURSE PRACTITIONER

## 2023-02-26 PROCEDURE — 3074F SYST BP LT 130 MM HG: CPT | Mod: CPTII,S$GLB,, | Performed by: NURSE PRACTITIONER

## 2023-02-26 PROCEDURE — 87651 STREP A DNA AMP PROBE: CPT | Mod: QW,S$GLB,, | Performed by: NURSE PRACTITIONER

## 2023-02-26 PROCEDURE — 1159F PR MEDICATION LIST DOCUMENTED IN MEDICAL RECORD: ICD-10-PCS | Mod: CPTII,S$GLB,, | Performed by: NURSE PRACTITIONER

## 2023-02-26 PROCEDURE — 1159F MED LIST DOCD IN RCRD: CPT | Mod: CPTII,S$GLB,, | Performed by: NURSE PRACTITIONER

## 2023-02-26 PROCEDURE — 87651 POCT STREP A MOLECULAR: ICD-10-PCS | Mod: QW,S$GLB,, | Performed by: NURSE PRACTITIONER

## 2023-02-26 PROCEDURE — 1160F RVW MEDS BY RX/DR IN RCRD: CPT | Mod: CPTII,S$GLB,, | Performed by: NURSE PRACTITIONER

## 2023-02-26 PROCEDURE — 3008F PR BODY MASS INDEX (BMI) DOCUMENTED: ICD-10-PCS | Mod: CPTII,S$GLB,, | Performed by: NURSE PRACTITIONER

## 2023-02-26 PROCEDURE — 87502 POCT INFLUENZA A/B MOLECULAR: ICD-10-PCS | Mod: QW,S$GLB,, | Performed by: NURSE PRACTITIONER

## 2023-02-26 PROCEDURE — 3079F PR MOST RECENT DIASTOLIC BLOOD PRESSURE 80-89 MM HG: ICD-10-PCS | Mod: CPTII,S$GLB,, | Performed by: NURSE PRACTITIONER

## 2023-02-26 PROCEDURE — 1160F PR REVIEW ALL MEDS BY PRESCRIBER/CLIN PHARMACIST DOCUMENTED: ICD-10-PCS | Mod: CPTII,S$GLB,, | Performed by: NURSE PRACTITIONER

## 2023-02-26 PROCEDURE — 99203 PR OFFICE/OUTPT VISIT, NEW, LEVL III, 30-44 MIN: ICD-10-PCS | Mod: S$GLB,,, | Performed by: NURSE PRACTITIONER

## 2023-02-26 PROCEDURE — 99203 OFFICE O/P NEW LOW 30 MIN: CPT | Mod: S$GLB,,, | Performed by: NURSE PRACTITIONER

## 2023-02-26 RX ORDER — CETIRIZINE HYDROCHLORIDE 10 MG/1
10 TABLET ORAL DAILY
Qty: 30 TABLET | Refills: 0 | Status: SHIPPED | OUTPATIENT
Start: 2023-02-26 | End: 2023-03-28

## 2023-02-26 RX ORDER — BENZONATATE 100 MG/1
100 CAPSULE ORAL 3 TIMES DAILY PRN
Qty: 30 CAPSULE | Refills: 0 | Status: SHIPPED | OUTPATIENT
Start: 2023-02-26 | End: 2023-03-08

## 2023-02-26 RX ORDER — FLUTICASONE PROPIONATE 50 MCG
2 SPRAY, SUSPENSION (ML) NASAL DAILY
Qty: 18.2 ML | Refills: 0 | Status: SHIPPED | OUTPATIENT
Start: 2023-02-26 | End: 2023-03-28

## 2023-02-26 NOTE — PROGRESS NOTES
"Subjective:       Patient ID: Emilio Gallagher is a 28 y.o. male.    Vitals:  height is 5' 9.02" (1.753 m) and weight is 87.1 kg (192 lb). His oral temperature is 98.3 °F (36.8 °C). His blood pressure is 122/82 and his pulse is 92. His respiration is 17 and oxygen saturation is 98%.     Chief Complaint: Cough    29yo male pt reports dry cough, fatigue, headaches, and scratchy throat that started last night.  Denies attempting any medications or treatments for symptoms.  Denies fever/chills, denies n/v/d, denies abd pain, denies chest pain, wheezing or SOB.  Reports known exposure to COVID 2-3 days prior to symptom onset.  Reports receiving COVID vaccination, denies flu vaccination.    Cough  This is a new problem. The current episode started yesterday. The cough is Non-productive. Associated symptoms include headaches and a sore throat. Pertinent negatives include no chest pain, chills, ear congestion, ear pain, fever, heartburn, hemoptysis, myalgias, nasal congestion, postnasal drip, rash, rhinorrhea, shortness of breath, sweats, weight loss or wheezing. He has tried nothing for the symptoms. There is no history of asthma, bronchiectasis, bronchitis, COPD, emphysema, environmental allergies or pneumonia.     Constitution: Positive for fatigue. Negative for chills and fever.   HENT:  Positive for congestion and sore throat. Negative for ear pain, postnasal drip, sinus pain, sinus pressure and trouble swallowing.    Cardiovascular:  Negative for chest pain.   Respiratory:  Positive for cough. Negative for chest tightness, sputum production, bloody sputum, shortness of breath and wheezing.    Gastrointestinal:  Negative for abdominal pain, nausea, vomiting, diarrhea and heartburn.   Musculoskeletal:  Negative for muscle ache.   Skin:  Negative for rash.   Allergic/Immunologic: Negative for environmental allergies.   Neurological:  Positive for headaches.     Objective:      Physical Exam   Constitutional: He is " oriented to person, place, and time. He appears well-developed. He is cooperative.  Non-toxic appearance. He does not appear ill. No distress.   HENT:   Head: Normocephalic and atraumatic.   Ears:   Right Ear: Hearing, external ear and ear canal normal. Tympanic membrane is bulging. Tympanic membrane is not erythematous and not retracted. A middle ear effusion (clear fluid) is present.   Left Ear: Hearing, external ear and ear canal normal. Tympanic membrane is bulging. Tympanic membrane is not erythematous and not retracted. A middle ear effusion (clear fluid) is present.   Nose: Mucosal edema (erythema to BL turbinates) and rhinorrhea (clear to BL nares) present. No purulent discharge or nasal deformity. No epistaxis. Right sinus exhibits no maxillary sinus tenderness and no frontal sinus tenderness. Left sinus exhibits no maxillary sinus tenderness and no frontal sinus tenderness.   Mouth/Throat: Uvula is midline and mucous membranes are normal. No trismus in the jaw. Normal dentition. No uvula swelling. Oropharyngeal exudate (cloudy postnasal drip) and posterior oropharyngeal erythema present. No posterior oropharyngeal edema. Tonsils are 2+ on the right. Tonsils are 2+ on the left. No tonsillar exudate.   Eyes: Conjunctivae and lids are normal. No scleral icterus.   Neck: Trachea normal and phonation normal. Neck supple. No edema present. No erythema present. No neck rigidity present.   Cardiovascular: Normal rate, regular rhythm, normal heart sounds and normal pulses.   Pulmonary/Chest: Effort normal and breath sounds normal. No accessory muscle usage or stridor. No tachypnea. No respiratory distress. He has no decreased breath sounds. He has no wheezes. He has no rhonchi. He has no rales.   Abdominal: Normal appearance.   Musculoskeletal: Normal range of motion.         General: No deformity. Normal range of motion.   Lymphadenopathy:        Head (right side): No submandibular adenopathy present.        Head  (left side): No submandibular adenopathy present.     He has no cervical adenopathy.   Neurological: He is alert and oriented to person, place, and time. He exhibits normal muscle tone. Coordination normal.   Skin: Skin is warm, dry, intact, not diaphoretic and not pale.   Psychiatric: His speech is normal and behavior is normal. Judgment and thought content normal.   Nursing note and vitals reviewed.    Results for orders placed or performed in visit on 02/26/23   SARS Coronavirus 2 Antigen, POCT Manual Read   Result Value Ref Range    SARS Coronavirus 2 Antigen Negative Negative     Acceptable Yes    POCT Influenza A/B MOLECULAR   Result Value Ref Range    POC Molecular Influenza A Ag Negative Negative, Not Reported    POC Molecular Influenza B Ag Negative Negative, Not Reported     Acceptable Yes    POCT Strep A, Molecular   Result Value Ref Range    Molecular Strep A, POC Negative Negative     Acceptable Yes            Assessment:       1. Upper respiratory tract infection, unspecified type    2. Cough, unspecified type    3. Sore throat          Plan:       Provided education on prescribed medications, recommended re-testing for COVID in 2-3 days if symptoms do not improve with treatment.  Provided education on return/ER precautions.  Pt verbalized understanding and agreed to plan.      Upper respiratory tract infection, unspecified type  -     cetirizine (ZYRTEC) 10 MG tablet; Take 1 tablet (10 mg total) by mouth once daily.  Dispense: 30 tablet; Refill: 0  -     fluticasone propionate (FLONASE) 50 mcg/actuation nasal spray; 2 sprays (100 mcg total) by Each Nostril route once daily.  Dispense: 18.2 mL; Refill: 0    Cough, unspecified type  -     SARS Coronavirus 2 Antigen, POCT Manual Read  -     POCT Influenza A/B MOLECULAR  -     benzonatate (TESSALON) 100 MG capsule; Take 1 capsule (100 mg total) by mouth 3 (three) times daily as needed for Cough.  Dispense: 30  "capsule; Refill: 0    Sore throat  -     POCT Strep A, Molecular  -     (Magic mouthwash) 1:1:1 diphenhydrAMINE(Benadryl) 12.5mg/5ml liq, aluminum & magnesium hydroxide-simethicone (Maalox), LIDOcaine viscous 2%; Swish and spit 10 mLs every 4 (four) hours as needed (throat pain).  Dispense: 360 mL; Refill: 0      Patient Instructions   If your condition worsens or fails to improve, we recommend that you receive another evaluation at the ER immediately contact your PCP to discuss your concerns, or return here.  You must understand that you've received an urgent care treatment only, and that you may be released before all your medical problems are known or treated.  You, the patient, will arrange for follow-up care as instructed.     If we discussed that I think your illness is viral, it will not respond to antibiotics and will last 10-14 days.  If we discussed "wait and see" antibiotics, and if over the next few days the symptoms worsen, start the antibiotics I have given you.     If you are female and on birth control pills and do take the antibiotics, use additional methods to prevent pregnancy while on the antibiotics and for one cycle after.     Flonase (fluticasone) is a nasal spray which is available over the counter and may help with your symptoms.  Zyrtec D, Claritin D, or Allegra D can also help with symptoms of congestion and drainage.  If you have hypertension, avoid using the "D" which is the decongestant formula.    If you just have drainage, you can take plain Zyrtec, Claritin, or Allegra.  If you just have a congested feeling, you can take pseudoephedrine (unless you have high blood pressure), which you have to sign for behind the counter.  Do not buy phenylephrine OTC, as it is not effective.    Rest and fluids are also important.  Tylenol or ibuprofen can also be used as directed for pain, unless you have an allergy to them or medical condition (such as stomach ulcers, kidney or liver disease, or use " blood thinners, etc.) for which you should not be taking these type of medications.     If you are flying in the next few days, Afrin nose drops for the airplane flight upon take off and landing may help.  Other than at those times, refrain from using Afrin.     If you were prescribed a narcotic or sedating cough medicine, do not drive or operate heavy machinery while taking these medications.

## 2024-05-08 ENCOUNTER — TELEPHONE (OUTPATIENT)
Dept: ORTHOPEDICS | Facility: CLINIC | Age: 29
End: 2024-05-08
Payer: COMMERCIAL

## 2024-05-08 NOTE — TELEPHONE ENCOUNTER
Attempt to contact patient regarding an appointment for back pain. Patient phone had a lot of static on the phone. Unable to leave message. Thanks.

## 2024-05-10 ENCOUNTER — TELEPHONE (OUTPATIENT)
Dept: ORTHOPEDICS | Facility: CLINIC | Age: 29
End: 2024-05-10
Payer: COMMERCIAL

## 2024-05-10 DIAGNOSIS — M50.30 DDD (DEGENERATIVE DISC DISEASE), CERVICAL: Primary | ICD-10-CM

## 2024-05-10 NOTE — PROGRESS NOTES
DATE: 5/14/2024  PATIENT: Emilio Gallagher    Supervising Physician: Russell Zee M.D.    CHIEF COMPLAINT: back pain    HISTORY:  Emilio Gallagher is a 29 y.o. male  here for initial evaluation of mid back pain (Neck - 0, Arm - 0, Back -5). The pain has been present intermittently for years. The patient describes the pain as aching and dull. The pain is worse with heavy lifting and working as a  and improved by massage. There is no associated numbness and tingling. There is no subjective weakness. Prior treatments have included massage, but no PT, ASTER or surgery.   The patient denies myelopathic symptoms such as handwriting changes or difficulty with buttons/coins/keys. Denies perineal paresthesias, bowel/bladder dysfunction.    PAST MEDICAL/SURGICAL HISTORY:  History reviewed. No pertinent past medical history.  Past Surgical History:   Procedure Laterality Date    APPENDECTOMY         Medications:  Current Outpatient Medications on File Prior to Visit   Medication Sig Dispense Refill    cetirizine (ZYRTEC) 10 MG tablet Take 1 tablet (10 mg total) by mouth once daily. 30 tablet 0    clobetasol (TEMOVATE) 0.05 % cream Apply topically 2 (two) times daily. 60 g 1    naproxen (NAPROSYN) 500 MG tablet Take 1 tablet (500 mg total) by mouth 2 (two) times daily with meals. 14 tablet 0    neomycin-polymyxin-hydrocortisone (CORTISPORIN) 3.5-10,000-10 mg-unit-mg/mL ophthalmic suspension Place 2 drops into the right eye every 4 (four) hours. 7.5 mL 0    promethazine-dextromethorphan (PROMETHAZINE-DM) 6.25-15 mg/5 mL Syrp Take 5 mLs by mouth nightly as needed. 118 mL 0     No current facility-administered medications on file prior to visit.       Social History:   Social History     Socioeconomic History    Marital status: Single   Tobacco Use    Smoking status: Every Day     Current packs/day: 0.50     Types: Cigarettes    Smokeless tobacco: Never   Substance and Sexual Activity    Alcohol use: No  "      REVIEW OF SYSTEMS:  Constitution: Negative. Negative for chills, fever and night sweats.   Cardiovascular: Negative for chest pain and syncope.   Respiratory: Negative for cough and shortness of breath.   Gastrointestinal: See HPI. Negative for nausea/vomiting. Negative for abdominal pain.  Genitourinary: See HPI. Negative for discoloration or dysuria.  Skin: Negative for dry skin, itching and rash.   Hematologic/Lymphatic: Negative for bleeding problem. Does not bruise/bleed easily.   Musculoskeletal: Negative for falls and muscle weakness.   Neurological: See HPI. No seizures.   Endocrine: Negative for polydipsia, polyphagia and polyuria.   Allergic/Immunologic: Negative for hives and persistent infections.  Psychiatric/Behavioral: Negative for depression and insomnia.         EXAM:  Ht 5' 9" (1.753 m)   Wt 93.1 kg (205 lb 2.2 oz)   BMI 30.29 kg/m²     General: The patient is a 29 y.o. male in no apparent distress, the patient is oriented to person, place and time.  Psych: Normal mood and affect  HEENT: Vision grossly intact, hearing intact to the spoken word.  Lungs: Respirations unlabored.  Gait: Normal station and gait, no difficulty with toe or heel walk.   Skin: Cervical skin negative for rashes, lesions, hairy patches and surgical scars.  Range of motion: Cervical range of motion is acceptable. There is mild tenderness to palpation.  Spinal Balance: Global saggital and coronal spinal balance acceptable, no significant for scoliosis and kyphosis.  Musculoskeletal: No pain with the range of motion of the bilateral shoulders and elbows. Normal bulk and contour of the bilateral hands.  Vascular: Bilateral hands warm and well perfused, radial pulses 2+ bilaterally.  Neurological: Normal strength and tone in all major motor groups in the bilateral upper and lower extremities. Normal sensation to light touch in the C5-T1 and L2-S1 dermatomes bilaterally.  Deep tendon reflexes symmetric 2+ in the bilateral " "upper and lower extremities.  Negative Inverted Radial Reflex and Agustin's bilaterally. Negative Babinski bilaterally.     IMAGING:   Today I personally reviewed AP, Lat and Flex/Ex  upright C-spine films that demonstrate no significant degenerative changes.      Body mass index is 30.29 kg/m².    No results found for: "HGBA1C"        ASSESSMENT/PLAN:    Emilio was seen today for low-back pain and back pain.    Diagnoses and all orders for this visit:    Chronic left-sided low back pain without sciatica  -     ibuprofen (ADVIL,MOTRIN) 800 MG tablet; Take 1 tablet (800 mg total) by mouth every 6 (six) hours as needed for Pain.  -     methocarbamoL (ROBAXIN) 750 MG Tab; Take 1 tablet (750 mg total) by mouth 3 (three) times daily as needed (muscle tension).      Today we discussed at length all of the different treatment options including anti-inflammatories, acetaminophen, rest, ice, heat, physical therapy including strengthening and stretching exercises, home exercises, ROM, aerobic conditioning, aqua therapy, other modalities including ultrasound, massage, and dry needling, epidural steroid injections and finally surgical intervention.  Likely muscular, medications sent to the pharmacy. He may follow up as needed.   I provided the patient with a home exercise program. It is the [facility] spine conditioning program. Exercises include head rolls, kneeling back extension, sitting rotation stretch, modified seated side straddle, knee to chest, bird dog, plank, modified seated plank, hip bridges, abdominal bracing, and abdominal crunch. Pt will complete each exercise 5 times daily for 6-8 weeks.        "

## 2024-05-14 ENCOUNTER — OFFICE VISIT (OUTPATIENT)
Dept: ORTHOPEDICS | Facility: CLINIC | Age: 29
End: 2024-05-14
Payer: COMMERCIAL

## 2024-05-14 ENCOUNTER — HOSPITAL ENCOUNTER (OUTPATIENT)
Dept: RADIOLOGY | Facility: HOSPITAL | Age: 29
Discharge: HOME | End: 2024-05-14
Attending: REGISTERED NURSE
Payer: COMMERCIAL

## 2024-05-14 VITALS — WEIGHT: 205.13 LBS | BODY MASS INDEX: 30.38 KG/M2 | HEIGHT: 69 IN

## 2024-05-14 DIAGNOSIS — M54.50 CHRONIC LEFT-SIDED LOW BACK PAIN WITHOUT SCIATICA: Primary | ICD-10-CM

## 2024-05-14 DIAGNOSIS — M50.30 DDD (DEGENERATIVE DISC DISEASE), CERVICAL: ICD-10-CM

## 2024-05-14 DIAGNOSIS — G89.29 CHRONIC LEFT-SIDED LOW BACK PAIN WITHOUT SCIATICA: Primary | ICD-10-CM

## 2024-05-14 PROCEDURE — 99999 PR PBB SHADOW E&M-EST. PATIENT-LVL III: CPT | Mod: PBBFAC,,, | Performed by: REGISTERED NURSE

## 2024-05-14 PROCEDURE — 1160F RVW MEDS BY RX/DR IN RCRD: CPT | Mod: CPTII,S$GLB,, | Performed by: REGISTERED NURSE

## 2024-05-14 PROCEDURE — 3008F BODY MASS INDEX DOCD: CPT | Mod: CPTII,S$GLB,, | Performed by: REGISTERED NURSE

## 2024-05-14 PROCEDURE — 72050 X-RAY EXAM NECK SPINE 4/5VWS: CPT | Mod: 26,,, | Performed by: INTERNAL MEDICINE

## 2024-05-14 PROCEDURE — 1159F MED LIST DOCD IN RCRD: CPT | Mod: CPTII,S$GLB,, | Performed by: REGISTERED NURSE

## 2024-05-14 PROCEDURE — 72050 X-RAY EXAM NECK SPINE 4/5VWS: CPT | Mod: TC

## 2024-05-14 PROCEDURE — 99204 OFFICE O/P NEW MOD 45 MIN: CPT | Mod: S$GLB,,, | Performed by: REGISTERED NURSE

## 2024-05-14 RX ORDER — METHOCARBAMOL 750 MG/1
750 TABLET, FILM COATED ORAL 3 TIMES DAILY PRN
Qty: 60 TABLET | Refills: 0 | Status: SHIPPED | OUTPATIENT
Start: 2024-05-14 | End: 2024-06-03

## 2024-05-14 RX ORDER — IBUPROFEN 800 MG/1
800 TABLET ORAL EVERY 6 HOURS PRN
Qty: 60 TABLET | Refills: 0 | Status: SHIPPED | OUTPATIENT
Start: 2024-05-14

## 2024-08-16 ENCOUNTER — OFFICE VISIT (OUTPATIENT)
Dept: URGENT CARE | Facility: CLINIC | Age: 29
End: 2024-08-16
Payer: COMMERCIAL

## 2024-08-16 VITALS
HEIGHT: 69 IN | HEART RATE: 60 BPM | OXYGEN SATURATION: 99 % | WEIGHT: 205 LBS | RESPIRATION RATE: 20 BRPM | BODY MASS INDEX: 30.36 KG/M2 | DIASTOLIC BLOOD PRESSURE: 75 MMHG | TEMPERATURE: 98 F | SYSTOLIC BLOOD PRESSURE: 119 MMHG

## 2024-08-16 DIAGNOSIS — H61.21 HEARING LOSS DUE TO CERUMEN IMPACTION, RIGHT: ICD-10-CM

## 2024-08-16 DIAGNOSIS — H60.501 ACUTE OTITIS EXTERNA OF RIGHT EAR, UNSPECIFIED TYPE: Primary | ICD-10-CM

## 2024-08-16 RX ORDER — NEOMYCIN SULFATE, POLYMYXIN B SULFATE, HYDROCORTISONE 3.5; 10000; 1 MG/ML; [USP'U]/ML; MG/ML
4 SOLUTION/ DROPS AURICULAR (OTIC) 3 TIMES DAILY
Qty: 10 ML | Refills: 0 | Status: SHIPPED | OUTPATIENT
Start: 2024-08-16 | End: 2024-08-26

## 2024-08-16 NOTE — PATIENT INSTRUCTIONS
- Rest.    - Drink plenty of fluids.    - Acetaminophen (tylenol) or Ibuprofen (advil,motrin) as directed as needed for fever/pain. Avoid tylenol if you have a history of liver disease. Do not take ibuprofen if you have a history of GI bleeding, kidney disease, or if you take blood thinners.     - Follow up with your PCP or specialty clinic as directed in the next 1-2 weeks if not improved or as needed.  You can call [phone] to schedule an appointment with the appropriate provider.    - Go to the ER or seek medical attention immediately if you develop new or worsening symptoms.     - You must understand that you have received an Urgent Care treatment only and that you may be released before all of your medical problems are known or treated.   - You, the patient, will arrange for follow up care as instructed.   - If your condition worsens or fails to improve we recommend that you receive another evaluation at the ER immediately or contact your PCP to discuss your concerns or return here.

## 2024-08-16 NOTE — PROGRESS NOTES
"Subjective:      Patient ID: Emilio Gallagher is a 29 y.o. male.    Vitals:  height is 5' 9" (1.753 m) and weight is 93 kg (205 lb). His oral temperature is 98.4 °F (36.9 °C). His blood pressure is 119/75 and his pulse is 60. His respiration is 20 and oxygen saturation is 99%.     Chief Complaint: Ear Fullness    Pt states that he is coming in for right ear fullness. Pt has a few days ago it began, tried to scrape wax out and soak the ear.  It still feels like there is fluid in there and it is full in clogged, slight discomfort but not particularly painful.   No recent URI symptoms.  No recent air travel.  He tried putting peroxide in ear and digging in there but had no improvement.     Ear Fullness   There is pain in the right ear. This is a new problem. Episode onset: 3 days ago. The problem occurs constantly. The problem has been unchanged. There has been no fever. The pain is at a severity of 3/10. The pain is mild. Associated symptoms include hearing loss. Pertinent negatives include no abdominal pain, coughing, diarrhea, headaches, neck pain, rash, sore throat or vomiting. He has tried nothing for the symptoms. The treatment provided no relief.       Constitution: Negative for chills, sweating, fatigue and fever.   HENT:  Positive for hearing loss. Negative for ear pain, foreign body in ear, congestion and sore throat.    Neck: Negative for neck pain and neck stiffness.   Cardiovascular:  Negative for chest pain, leg swelling, palpitations and sob on exertion.   Eyes:  Negative for eye itching, eye pain and eye redness.   Respiratory:  Negative for chest tightness, cough, sputum production and shortness of breath.    Gastrointestinal:  Negative for abdominal pain, nausea, vomiting and diarrhea.   Genitourinary:  Negative for dysuria, frequency, urgency, flank pain and hematuria.   Musculoskeletal:  Negative for pain and joint pain.   Skin:  Negative for color change and rash.   Neurological:  Negative for " dizziness, light-headedness, passing out, headaches, disorientation and numbness.   Psychiatric/Behavioral:  Negative for disorientation.       Objective:     Physical Exam   Constitutional: He is oriented to person, place, and time. He appears well-developed. No distress.   HENT:   Head: Normocephalic and atraumatic.   Ears:   Right Ear: Tympanic membrane and external ear normal. There is swelling. impacted cerumen  Left Ear: Hearing, tympanic membrane, external ear and ear canal normal.   Left ear within normal limits.  Right ear cerumen impaction present.  Completely clear with irrigation.  Patient feeling better and had resolution of the fullness and muffled hearing.  There is no tragal tenderness or pain with movement of pinna, however the right external auditory canal is erythematous, edematous, with scant exudate on canal walls, with the appearance of otitis externa.      Comments: Left ear within normal limits.  Right ear cerumen impaction present.  Completely clear with irrigation.  Patient feeling better and had resolution of the fullness and muffled hearing.  There is no tragal tenderness or pain with movement of pinna, however the right external auditory canal is erythematous, edematous, with scant exudate on canal walls, with the appearance of otitis externa.  Eyes: Conjunctivae are normal. Right eye exhibits no discharge. Left eye exhibits no discharge. No scleral icterus.   Pulmonary/Chest: Effort normal. No stridor. No respiratory distress. He has no wheezes.   Neurological: He is alert and oriented to person, place, and time.   Skin: Skin is not diaphoretic.   Psychiatric: His behavior is normal. Judgment and thought content normal.   Nursing note and vitals reviewed.      Assessment:     1. Acute otitis externa of right ear, unspecified type    2. Hearing loss due to cerumen impaction, right        Plan:       Acute otitis externa of right ear, unspecified type  -      neomycin-polymyxin-hydrocortisone (CORTISPORIN) otic solution; Place 4 drops into the right ear 3 (three) times daily. for 10 days  Dispense: 10 mL; Refill: 0    Hearing loss due to cerumen impaction, right  -     Ear wax removal  -     Ear Cerumen Removal      After irrigation, ear canal has the appearance of otitis externa.  TM is intact.  Discussed that this may be from prior peroxide use that has the appearance of exudate and erythema from irrigation but will cover with drops given appearance consistent with otitis externa.  Instructed monitor closely, follow up for new or worsening symptoms.

## 2024-08-16 NOTE — PROCEDURES
Ear Cerumen Removal    Date/Time: 8/16/2024 6:30 PM    Performed by: Dale Salinas PA-C  Authorized by: Dale Salinas PA-C    Consent Done?:  Yes (Verbal)    Local anesthetic:  None  Location details:  Right ear  Procedure type: irrigation    Cerumen  Removal Results:  Cerumen completely removed  Patient tolerance:  Patient tolerated the procedure well with no immediate complications

## 2024-10-09 ENCOUNTER — OFFICE VISIT (OUTPATIENT)
Dept: URGENT CARE | Facility: CLINIC | Age: 29
End: 2024-10-09
Payer: COMMERCIAL

## 2024-10-09 VITALS
RESPIRATION RATE: 16 BRPM | HEIGHT: 69 IN | DIASTOLIC BLOOD PRESSURE: 77 MMHG | BODY MASS INDEX: 30.36 KG/M2 | TEMPERATURE: 99 F | WEIGHT: 205 LBS | SYSTOLIC BLOOD PRESSURE: 116 MMHG | HEART RATE: 104 BPM | OXYGEN SATURATION: 98 %

## 2024-10-09 DIAGNOSIS — F17.200 SMOKER: ICD-10-CM

## 2024-10-09 DIAGNOSIS — H66.91 RIGHT ACUTE OTITIS MEDIA: ICD-10-CM

## 2024-10-09 DIAGNOSIS — J98.01 COUGH DUE TO BRONCHOSPASM: Primary | ICD-10-CM

## 2024-10-09 LAB
CTP QC/QA: YES
CTP QC/QA: YES
POC MOLECULAR INFLUENZA A AGN: NEGATIVE
POC MOLECULAR INFLUENZA B AGN: NEGATIVE
SARS-COV-2 AG RESP QL IA.RAPID: NEGATIVE

## 2024-10-09 PROCEDURE — 87502 INFLUENZA DNA AMP PROBE: CPT | Mod: QW,S$GLB,, | Performed by: NURSE PRACTITIONER

## 2024-10-09 PROCEDURE — 87811 SARS-COV-2 COVID19 W/OPTIC: CPT | Mod: QW,S$GLB,, | Performed by: NURSE PRACTITIONER

## 2024-10-09 PROCEDURE — 99499 UNLISTED E&M SERVICE: CPT | Mod: S$GLB,,, | Performed by: NURSE PRACTITIONER

## 2024-10-09 RX ORDER — AMOXICILLIN AND CLAVULANATE POTASSIUM 875; 125 MG/1; MG/1
1 TABLET, FILM COATED ORAL EVERY 12 HOURS
Qty: 14 TABLET | Refills: 0 | Status: ON HOLD | OUTPATIENT
Start: 2024-10-09 | End: 2024-10-14 | Stop reason: HOSPADM

## 2024-10-09 RX ORDER — ALBUTEROL SULFATE 90 UG/1
2 INHALANT RESPIRATORY (INHALATION) EVERY 4 HOURS PRN
Qty: 18 G | Refills: 0 | Status: ON HOLD | OUTPATIENT
Start: 2024-10-09 | End: 2024-10-14

## 2024-10-09 NOTE — LETTER
October 9, 2024      [facility]  [address]  [facility] [address]  Phone: [phone]  Fax: [phone]       Patient: Emilio Gallagher   YOB: 1995  Date of Visit: 10/09/2024    To Whom It May Concern:    Sina Gallagher  was at [facility] on 10/09/2024. The patient may return to work/school on 10/11/2024 with no restrictions. If you have any questions or concerns, or if I can be of further assistance, please do not hesitate to contact me.    Sincerely,    Lynne Veronica, NP

## 2024-10-09 NOTE — PATIENT INSTRUCTIONS
Consider smoking cessation  Use inhaler every 4-6 hours as needed  Start mucinex   Take antibiotics as prescribed.   Motrin or tylenol as needed for fever.       You must understand that you've received an Urgent Care treatment only and that you may be released before all your medical problems are known or treated. You, the patient, will arrange for follow up care as instructed.  If your condition worsens we recommend that you receive another evaluation at the emergency room immediately or contact your primary medical clinics after hours call service to discuss your concerns.  Please return here or go to the Emergency Department for any concerns or worsening of condition.

## 2024-10-11 ENCOUNTER — HOSPITAL ENCOUNTER (INPATIENT)
Facility: HOSPITAL | Age: 29
LOS: 3 days | Discharge: HOME | DRG: 871 | End: 2024-10-14
Attending: EMERGENCY MEDICINE | Admitting: STUDENT IN AN ORGANIZED HEALTH CARE EDUCATION/TRAINING PROGRAM
Payer: COMMERCIAL

## 2024-10-11 DIAGNOSIS — Z09 HOSPITAL DISCHARGE FOLLOW-UP: ICD-10-CM

## 2024-10-11 DIAGNOSIS — F17.200 TOBACCO DEPENDENCY: ICD-10-CM

## 2024-10-11 DIAGNOSIS — J06.9 UPPER RESPIRATORY TRACT INFECTION, UNSPECIFIED TYPE: ICD-10-CM

## 2024-10-11 DIAGNOSIS — R07.9 CHEST PAIN: ICD-10-CM

## 2024-10-11 DIAGNOSIS — R50.9 FEVER, UNSPECIFIED FEVER CAUSE: ICD-10-CM

## 2024-10-11 DIAGNOSIS — A41.9 SEPSIS, DUE TO UNSPECIFIED ORGANISM, UNSPECIFIED WHETHER ACUTE ORGAN DYSFUNCTION PRESENT: Primary | ICD-10-CM

## 2024-10-11 DIAGNOSIS — R00.0 TACHYCARDIA: ICD-10-CM

## 2024-10-11 DIAGNOSIS — J98.01 COUGH DUE TO BRONCHOSPASM: ICD-10-CM

## 2024-10-11 PROBLEM — J96.01 ACUTE HYPOXIC RESPIRATORY FAILURE: Status: ACTIVE | Noted: 2024-10-11

## 2024-10-11 PROBLEM — R12 HEARTBURN: Status: ACTIVE | Noted: 2024-10-11

## 2024-10-11 PROBLEM — K59.00 CONSTIPATION: Status: ACTIVE | Noted: 2024-10-11

## 2024-10-11 LAB
ALBUMIN SERPL BCP-MCNC: 4.2 G/DL (ref 3.5–5.2)
ALLENS TEST: NORMAL
ALP SERPL-CCNC: 45 U/L (ref 55–135)
ALT SERPL W/O P-5'-P-CCNC: 32 U/L (ref 10–44)
ANION GAP SERPL CALC-SCNC: 10 MMOL/L (ref 8–16)
AST SERPL-CCNC: 23 U/L (ref 10–40)
BASOPHILS # BLD AUTO: 0.05 K/UL (ref 0–0.2)
BASOPHILS NFR BLD: 0.6 % (ref 0–1.9)
BILIRUB SERPL-MCNC: 0.9 MG/DL (ref 0.1–1)
BILIRUB UR QL STRIP: NEGATIVE
BNP SERPL-MCNC: <10 PG/ML (ref 0–99)
BUN SERPL-MCNC: 12 MG/DL (ref 6–20)
CALCIUM SERPL-MCNC: 9.7 MG/DL (ref 8.7–10.5)
CHLORIDE SERPL-SCNC: 101 MMOL/L (ref 95–110)
CK SERPL-CCNC: 105 U/L (ref 20–200)
CLARITY UR REFRACT.AUTO: CLEAR
CO2 SERPL-SCNC: 23 MMOL/L (ref 23–29)
COLOR UR AUTO: COLORLESS
CREAT SERPL-MCNC: 1.2 MG/DL (ref 0.5–1.4)
D DIMER PPP IA.FEU-MCNC: 0.73 MG/L FEU
DELSYS: NORMAL
DIFFERENTIAL METHOD BLD: ABNORMAL
EOSINOPHIL # BLD AUTO: 0 K/UL (ref 0–0.5)
EOSINOPHIL NFR BLD: 0 % (ref 0–8)
ERYTHROCYTE [DISTWIDTH] IN BLOOD BY AUTOMATED COUNT: 18.2 % (ref 11.5–14.5)
EST. GFR  (NO RACE VARIABLE): >60 ML/MIN/1.73 M^2
ESTIMATED AVG GLUCOSE: 100 MG/DL (ref 68–131)
FIO2: 21
GLUCOSE SERPL-MCNC: 109 MG/DL (ref 70–110)
GLUCOSE UR QL STRIP: NEGATIVE
HBA1C MFR BLD: 5.1 % (ref 4–5.6)
HCT VFR BLD AUTO: 43.8 % (ref 40–54)
HCV AB SERPL QL IA: NORMAL
HGB BLD-MCNC: 13.7 G/DL (ref 14–18)
HGB UR QL STRIP: NEGATIVE
HIV 1+2 AB+HIV1 P24 AG SERPL QL IA: NORMAL
IMM GRANULOCYTES # BLD AUTO: 0.03 K/UL (ref 0–0.04)
IMM GRANULOCYTES NFR BLD AUTO: 0.3 % (ref 0–0.5)
INFLUENZA A, MOLECULAR: NOT DETECTED
INFLUENZA B, MOLECULAR: NOT DETECTED
KETONES UR QL STRIP: NEGATIVE
LDH SERPL L TO P-CCNC: 1.51 MMOL/L (ref 0.5–2.2)
LEUKOCYTE ESTERASE UR QL STRIP: NEGATIVE
LYMPHOCYTES # BLD AUTO: 0.9 K/UL (ref 1–4.8)
LYMPHOCYTES NFR BLD: 10.2 % (ref 18–48)
MAGNESIUM SERPL-MCNC: 2.1 MG/DL (ref 1.6–2.6)
MCH RBC QN AUTO: 19.3 PG (ref 27–31)
MCHC RBC AUTO-ENTMCNC: 31.3 G/DL (ref 32–36)
MCV RBC AUTO: 62 FL (ref 82–98)
MODE: NORMAL
MONOCYTES # BLD AUTO: 0.7 K/UL (ref 0.3–1)
MONOCYTES NFR BLD: 8.3 % (ref 4–15)
NEUTROPHILS # BLD AUTO: 7.2 K/UL (ref 1.8–7.7)
NEUTROPHILS NFR BLD: 80.6 % (ref 38–73)
NITRITE UR QL STRIP: NEGATIVE
NRBC BLD-RTO: 0 /100 WBC
OHS QRS DURATION: 80 MS
OHS QTC CALCULATION: 397 MS
PH UR STRIP: 7 [PH] (ref 5–8)
PHOSPHATE SERPL-MCNC: 2.1 MG/DL (ref 2.7–4.5)
PLATELET # BLD AUTO: 232 K/UL (ref 150–450)
PMV BLD AUTO: 9.5 FL (ref 9.2–12.9)
POTASSIUM SERPL-SCNC: 3.7 MMOL/L (ref 3.5–5.1)
PROCALCITONIN SERPL IA-MCNC: 0.26 NG/ML
PROT SERPL-MCNC: 7.7 G/DL (ref 6–8.4)
PROT UR QL STRIP: NEGATIVE
RBC # BLD AUTO: 7.1 M/UL (ref 4.6–6.2)
RSV AG BY MOLECULAR METHOD: NOT DETECTED
SAMPLE: NORMAL
SARS-COV-2 RNA RESP QL NAA+PROBE: NOT DETECTED
SITE: NORMAL
SODIUM SERPL-SCNC: 134 MMOL/L (ref 136–145)
SP GR UR STRIP: 1.02 (ref 1–1.03)
TROPONIN I SERPL DL<=0.01 NG/ML-MCNC: 0.01 NG/ML (ref 0–0.03)
TROPONIN I SERPL DL<=0.01 NG/ML-MCNC: <0.006 NG/ML (ref 0–0.03)
TSH SERPL DL<=0.005 MIU/L-ACNC: 1.43 UIU/ML (ref 0.4–4)
URN SPEC COLLECT METH UR: ABNORMAL
WBC # BLD AUTO: 8.9 K/UL (ref 3.9–12.7)

## 2024-10-11 PROCEDURE — 83735 ASSAY OF MAGNESIUM: CPT | Performed by: EMERGENCY MEDICINE

## 2024-10-11 PROCEDURE — 84484 ASSAY OF TROPONIN QUANT: CPT | Mod: 91

## 2024-10-11 PROCEDURE — 96367 TX/PROPH/DG ADDL SEQ IV INF: CPT

## 2024-10-11 PROCEDURE — 25000003 PHARM REV CODE 250

## 2024-10-11 PROCEDURE — G0378 HOSPITAL OBSERVATION PER HR: HCPCS

## 2024-10-11 PROCEDURE — 84100 ASSAY OF PHOSPHORUS: CPT | Performed by: EMERGENCY MEDICINE

## 2024-10-11 PROCEDURE — 87040 BLOOD CULTURE FOR BACTERIA: CPT | Performed by: EMERGENCY MEDICINE

## 2024-10-11 PROCEDURE — 83036 HEMOGLOBIN GLYCOSYLATED A1C: CPT

## 2024-10-11 PROCEDURE — 86803 HEPATITIS C AB TEST: CPT | Performed by: PHYSICIAN ASSISTANT

## 2024-10-11 PROCEDURE — 85379 FIBRIN DEGRADATION QUANT: CPT

## 2024-10-11 PROCEDURE — 96361 HYDRATE IV INFUSION ADD-ON: CPT

## 2024-10-11 PROCEDURE — 93010 ELECTROCARDIOGRAM REPORT: CPT | Mod: ,,, | Performed by: INTERNAL MEDICINE

## 2024-10-11 PROCEDURE — 84145 PROCALCITONIN (PCT): CPT | Performed by: EMERGENCY MEDICINE

## 2024-10-11 PROCEDURE — 84443 ASSAY THYROID STIM HORMONE: CPT

## 2024-10-11 PROCEDURE — 86720 LEPTOSPIRA ANTIBODY: CPT | Performed by: PHYSICIAN ASSISTANT

## 2024-10-11 PROCEDURE — 25000003 PHARM REV CODE 250: Performed by: PHYSICIAN ASSISTANT

## 2024-10-11 PROCEDURE — 63600175 PHARM REV CODE 636 W HCPCS

## 2024-10-11 PROCEDURE — 80053 COMPREHEN METABOLIC PANEL: CPT | Performed by: EMERGENCY MEDICINE

## 2024-10-11 PROCEDURE — 96375 TX/PRO/DX INJ NEW DRUG ADDON: CPT

## 2024-10-11 PROCEDURE — 25500020 PHARM REV CODE 255: Performed by: STUDENT IN AN ORGANIZED HEALTH CARE EDUCATION/TRAINING PROGRAM

## 2024-10-11 PROCEDURE — 0241U SARS-COV2 (COVID) WITH FLU/RSV BY PCR: CPT | Performed by: PHYSICIAN ASSISTANT

## 2024-10-11 PROCEDURE — 83880 ASSAY OF NATRIURETIC PEPTIDE: CPT | Performed by: EMERGENCY MEDICINE

## 2024-10-11 PROCEDURE — 85025 COMPLETE CBC W/AUTO DIFF WBC: CPT | Performed by: EMERGENCY MEDICINE

## 2024-10-11 PROCEDURE — 81003 URINALYSIS AUTO W/O SCOPE: CPT

## 2024-10-11 PROCEDURE — 96365 THER/PROPH/DIAG IV INF INIT: CPT

## 2024-10-11 PROCEDURE — 99900035 HC TECH TIME PER 15 MIN (STAT)

## 2024-10-11 PROCEDURE — 82550 ASSAY OF CK (CPK): CPT | Performed by: EMERGENCY MEDICINE

## 2024-10-11 PROCEDURE — 87389 HIV-1 AG W/HIV-1&-2 AB AG IA: CPT | Performed by: PHYSICIAN ASSISTANT

## 2024-10-11 PROCEDURE — 99285 EMERGENCY DEPT VISIT HI MDM: CPT | Mod: 25

## 2024-10-11 PROCEDURE — 93005 ELECTROCARDIOGRAM TRACING: CPT

## 2024-10-11 PROCEDURE — 63600175 PHARM REV CODE 636 W HCPCS: Performed by: PHYSICIAN ASSISTANT

## 2024-10-11 PROCEDURE — 96366 THER/PROPH/DIAG IV INF ADDON: CPT

## 2024-10-11 PROCEDURE — 84484 ASSAY OF TROPONIN QUANT: CPT | Performed by: EMERGENCY MEDICINE

## 2024-10-11 PROCEDURE — 83605 ASSAY OF LACTIC ACID: CPT

## 2024-10-11 RX ORDER — POLYETHYLENE GLYCOL 3350 17 G/17G
17 POWDER, FOR SOLUTION ORAL DAILY
Status: DISCONTINUED | OUTPATIENT
Start: 2024-10-11 | End: 2024-10-14 | Stop reason: HOSPADM

## 2024-10-11 RX ORDER — GLUCAGON 1 MG
1 KIT INJECTION
Status: DISCONTINUED | OUTPATIENT
Start: 2024-10-11 | End: 2024-10-14 | Stop reason: HOSPADM

## 2024-10-11 RX ORDER — AZITHROMYCIN 250 MG/1
500 TABLET, FILM COATED ORAL DAILY
Status: COMPLETED | OUTPATIENT
Start: 2024-10-12 | End: 2024-10-14

## 2024-10-11 RX ORDER — SODIUM CHLORIDE 0.9 % (FLUSH) 0.9 %
10 SYRINGE (ML) INJECTION EVERY 12 HOURS PRN
Status: DISCONTINUED | OUTPATIENT
Start: 2024-10-11 | End: 2024-10-14 | Stop reason: HOSPADM

## 2024-10-11 RX ORDER — ENOXAPARIN SODIUM 100 MG/ML
40 INJECTION SUBCUTANEOUS EVERY 24 HOURS
Status: DISCONTINUED | OUTPATIENT
Start: 2024-10-12 | End: 2024-10-14 | Stop reason: HOSPADM

## 2024-10-11 RX ORDER — IPRATROPIUM BROMIDE AND ALBUTEROL SULFATE 2.5; .5 MG/3ML; MG/3ML
3 SOLUTION RESPIRATORY (INHALATION) EVERY 8 HOURS
Status: DISCONTINUED | OUTPATIENT
Start: 2024-10-11 | End: 2024-10-14 | Stop reason: HOSPADM

## 2024-10-11 RX ORDER — AZITHROMYCIN 250 MG/1
500 TABLET, FILM COATED ORAL DAILY
Status: CANCELLED | OUTPATIENT
Start: 2024-10-12 | End: 2024-10-15

## 2024-10-11 RX ORDER — IBUPROFEN 200 MG
24 TABLET ORAL
Status: DISCONTINUED | OUTPATIENT
Start: 2024-10-11 | End: 2024-10-14 | Stop reason: HOSPADM

## 2024-10-11 RX ORDER — ACETAMINOPHEN 500 MG
1000 TABLET ORAL EVERY 8 HOURS PRN
Status: DISCONTINUED | OUTPATIENT
Start: 2024-10-11 | End: 2024-10-14 | Stop reason: HOSPADM

## 2024-10-11 RX ORDER — CALCIUM CARBONATE 200(500)MG
500 TABLET,CHEWABLE ORAL 3 TIMES DAILY PRN
Status: DISCONTINUED | OUTPATIENT
Start: 2024-10-11 | End: 2024-10-14 | Stop reason: HOSPADM

## 2024-10-11 RX ORDER — NALOXONE HCL 0.4 MG/ML
0.02 VIAL (ML) INJECTION
Status: DISCONTINUED | OUTPATIENT
Start: 2024-10-11 | End: 2024-10-14 | Stop reason: HOSPADM

## 2024-10-11 RX ORDER — ACETAMINOPHEN 500 MG
1000 TABLET ORAL
Status: COMPLETED | OUTPATIENT
Start: 2024-10-11 | End: 2024-10-11

## 2024-10-11 RX ORDER — IBUPROFEN 200 MG
16 TABLET ORAL
Status: DISCONTINUED | OUTPATIENT
Start: 2024-10-11 | End: 2024-10-14 | Stop reason: HOSPADM

## 2024-10-11 RX ORDER — KETOROLAC TROMETHAMINE 30 MG/ML
10 INJECTION, SOLUTION INTRAMUSCULAR; INTRAVENOUS
Status: COMPLETED | OUTPATIENT
Start: 2024-10-11 | End: 2024-10-11

## 2024-10-11 RX ADMIN — ACETAMINOPHEN 1000 MG: 500 TABLET ORAL at 01:10

## 2024-10-11 RX ADMIN — AZITHROMYCIN MONOHYDRATE 500 MG: 500 INJECTION, POWDER, LYOPHILIZED, FOR SOLUTION INTRAVENOUS at 02:10

## 2024-10-11 RX ADMIN — SODIUM CHLORIDE, POTASSIUM CHLORIDE, SODIUM LACTATE AND CALCIUM CHLORIDE 2121 ML: 600; 310; 30; 20 INJECTION, SOLUTION INTRAVENOUS at 02:10

## 2024-10-11 RX ADMIN — KETOROLAC TROMETHAMINE 10 MG: 30 INJECTION, SOLUTION INTRAMUSCULAR; INTRAVENOUS at 01:10

## 2024-10-11 RX ADMIN — ACETAMINOPHEN 1000 MG: 500 TABLET ORAL at 07:10

## 2024-10-11 RX ADMIN — SODIUM CHLORIDE, POTASSIUM CHLORIDE, SODIUM LACTATE AND CALCIUM CHLORIDE 600 ML: 600; 310; 30; 20 INJECTION, SOLUTION INTRAVENOUS at 10:10

## 2024-10-11 RX ADMIN — VANCOMYCIN HYDROCHLORIDE 1750 MG: 10 INJECTION, POWDER, LYOPHILIZED, FOR SOLUTION INTRAVENOUS at 04:10

## 2024-10-11 RX ADMIN — CEFTRIAXONE 2 G: 2 INJECTION, POWDER, FOR SOLUTION INTRAMUSCULAR; INTRAVENOUS at 02:10

## 2024-10-11 RX ADMIN — IOHEXOL 100 ML: 350 INJECTION, SOLUTION INTRAVENOUS at 04:10

## 2024-10-11 NOTE — ED TRIAGE NOTES
"Pt started feeling tired 5 days ago, started with a fever 4 days and states " everything started tasting funny"  Three days ago he was seen at an urgent care 3 days ago and was given Amoxicillin with and inhaler   "

## 2024-10-11 NOTE — Clinical Note
Diagnosis: Sepsis, due to unspecified organism, unspecified whether acute organ dysfunction present [[id number]]   Future Attending Provider: MARIFER LOUIE [[id number]]   Is the patient being sent to ED Observation?: No

## 2024-10-11 NOTE — ED NOTES
Pt identifiers Emilio Joshua Gileschecked and correct  LOC: The patient is awake, alert, aware of environment with an appropriate affect. Oriented x3, speaking appropriately  APPEARANCE: Pt resting comfortably, in no acute distress, pt is clean and well groomed, clothing properly fastened  SKIN: Skin warm, dry and intact, normal skin turgor, moist mucus membranes  RESPIRATORY: Airway is open and patent, respirations are spontaneous, even and unlabored, normal effort and rate  CARDIAC: Normal rate and rhythm, no peripheral edema noted, capillary refill < 3 seconds, bilateral radial pulses 2+  ABDOMEN: Soft, nontender, nondistended. Bowel sounds present   NEUROLOGIC: PERRL, facial expression is symmetrical, patient moving all extremities spontaneously, normal sensation in all extremities when touched with a finger.  Follows all commands appropriately  MUSCULOSKELETAL: No obvious deformities.

## 2024-10-11 NOTE — ED NOTES
Pt identifiers Emilio Gallagher were checked and are correct  LOC: The patient is awake, alert, aware of environment with an appropriate affect. Oriented x4, speaking appropriately  APPEARANCE: Pt rates chest pain  upon coughing an 8/10 , rates headache a 6/10 ,complains of feet being cold  in no acute distress, pt is clean and well groomed, clothing properly fastened  SKIN: Skin not to touch , dry and intact, normal skin turgor, moist mucus membranes  RESPIRATORY: Airway is open and patent, respirations are spontaneous, even and unlabored, normal effort and rate Crackles auscultated to lower lung fields   CARDIAC: Normal rate and rhythm, no peripheral edema noted, capillary refill < 3 seconds, bilateral radial pulses 2+  Pt is on a cardiac monitor and pulse oximetry  ABDOMEN: Soft, nontender, nondistended. Bowel sounds present   NEUROLOGIC: PERRL, facial expression is symmetrical, patient moving all extremities spontaneously, normal sensation in all extremities when touched with a finger.  Follows all commands appropriately  MUSCULOSKELETAL: No obvious deformities.

## 2024-10-11 NOTE — ASSESSMENT & PLAN NOTE
Patient with Hypoxic Respiratory failure which is Acute.  he is not on home oxygen. Supplemental oxygen was provided and noted-      .   Signs/symptoms of respiratory failure include- tachypnea, increased work of breathing, and SOB . Contributing diagnoses includes -  sepsis  Labs and images were reviewed. Patient Has not had a recent ABG. Will treat underlying causes and adjust management of respiratory failure as follows    Plan:   - pulse ox of 91% on admission  - patient requiring supplemental O2   - currently on 3L O2 NC  - continue to monitor  - goal is to wean off of O2

## 2024-10-11 NOTE — ED NOTES
Assumed care of the patient. Report received from DEMETRI Joshi. PPt in hospital gown, side rails up X2, bed low and locked, and call light is placed within reach. One family/visitors at bedside at this time. Pt denies any complaints or needs.        LOC: The patient is awake, alert and aware of environment with an appropriate affect, the patient is oriented x 3 and speaking appropriately.   APPEARANCE: Patient appears comfortable and in no acute distress, patient is clean and well groomed.  SKIN: The skin is warm and dry, color consistent with ethnicity.   MUSCULOSKELETAL: Patient moving all extremities spontaneously, no swelling noted.  RESPIRATORY: Airway is open and patent, respirations are spontaneous, patient has a normal effort and rate, no accessory muscle use noted. Pt arrives to unit on 3L O2 via nasal cannula. Intermittent cough noted.  CARDIAC: Patient has a normal rate and regular rhythm, no edema noted, capillary refill < 3 seconds.   GASTRO: Soft and non tender to palpation, no distention noted.   : Pt denies any pain or frequency with urination.  NEURO: Pt opens eyes spontaneously, behavior appropriate to situation, follows commands.

## 2024-10-11 NOTE — FIRST PROVIDER EVALUATION
Medical screening examination initiated.  I have conducted a focused provider triage encounter, findings are as follows:    Brief history of present illness:  28 yo male presenting with intermittent fever, reports can't breath. Notes cough with blood yesterday. Notes he has been to two different ERs this week. Told Tuesday he has PNA. Reports using inhaler    There were no vitals filed for this visit.    Pertinent physical exam:  Well appearing and in no acute distress. Mildly ill appearing. 91% on RA    Brief workup plan:  CXR, cultures, labs    Preliminary workup initiated; this workup will be continued and followed by the physician or advanced practice provider that is assigned to the patient when roomed.

## 2024-10-11 NOTE — LETTER
October 14, 2024         [address]  [facility] [address]  Phone: [phone]  Fax: [phone]       Patient: Emilio Gallagher   YOB: 1995  Date of Visit: 10/14/2024    To Whom It May Concern:    Sina Gallagher  was at [facility] on 10/11/2024-10/14/2024. The patient may return to work/school on 10/17/2024 with no restrictions. If you have any questions or concerns, or if I can be of further assistance, please do not hesitate to contact me.    Sincerely,        Adry Fernandez MD

## 2024-10-11 NOTE — H&P
[facility] - Emergency Dept  [facility] Medicine  History & Physical    Patient Name: Emilio Gallagher  MRN: [id number]  Patient Class: OP- Observation  Admission Date: 10/11/2024  Attending Physician: Mary Alice Lopez MD   Primary Care Provider: Una Primary Doctor         Patient information was obtained from patient, spouse/SO, and ER records.     Subjective:     Principal Problem:Sepsis    Chief Complaint:   Chief Complaint   Patient presents with    Pneumonia     On tues told pneumonia, seen twice, running fever, and sob,        HPI: Emilio Gallagher is a 29 y.o. male with no significant PMH besides being a smoker, and family hx of MI presenting to the ED for SOB and fever. Per patient, fever has reached a Tmax of 105. He reports going mudriding on Saturday and feeling well until that evening when he noticed his taste was altered and has been since that evening. He had isolated vomiting episodes on 10/7 and 10/8 with emesis being lime green in color. Had 1 episode of small hemoptysis. Patient reports fevers minimally decreased with tylenol and ibuprofen to a low of 101. He visited an urgent care on 10/9 and was diagnosed with possible pneumonia and given an albuterol inhaler for SOB and Augmentin. On the same day, he went to outside ER and was diagnosed with left lower lobe pneumonia and was told to continue Augmentin. Patient is a current smoker with 7 pack years. He denies any recent travel, recent sick contacts, or illicit drug use. Does have a pet dog at home. Patient does have c/o chest pressure and SOB requiring 3L O2.     ED course: BP: 124/58, HR: 128, RR: 28, T: 103.8 O2: 91%. CBC and CMP completed. Blood cultures x2 collected. Troponin and BNP negative. HIV 1/2 Ab are nonreactive. Hep C Ab nonreactive. No detection of Covid, Influenza, or RSV. Lactate of 1.51. CXR shows clear lungs and no pleural effusion. CT abdomen/pelvis reading in process. Patient given fluids and started on vancomycin,  azithromycin, and ceftriaxone. UA is clean.    History reviewed. No pertinent past medical history.    Past Surgical History:   Procedure Laterality Date    APPENDECTOMY         Review of patient's allergies indicates:  No Known Allergies    No current facility-administered medications on file prior to encounter.     Current Outpatient Medications on File Prior to Encounter   Medication Sig    albuterol (PROVENTIL HFA) 90 mcg/actuation inhaler Inhale 2 puffs into the lungs every 4 (four) hours as needed for Wheezing. Rescue    amoxicillin-clavulanate 875-125mg (AUGMENTIN) 875-125 mg per tablet Take 1 tablet by mouth every 12 (twelve) hours. for 7 days    ibuprofen (ADVIL,MOTRIN) 800 MG tablet Take 1 tablet (800 mg total) by mouth every 6 (six) hours as needed for Pain.    cetirizine (ZYRTEC) 10 MG tablet Take 1 tablet (10 mg total) by mouth once daily.    clobetasol (TEMOVATE) 0.05 % cream Apply topically 2 (two) times daily.     Family History       Problem Relation (Age of Onset)    No Known Problems Mother, Father          Tobacco Use    Smoking status: Every Day     Current packs/day: 0.50     Types: Cigarettes    Smokeless tobacco: Never   Substance and Sexual Activity    Alcohol use: No    Drug use: Never    Sexual activity: Yes     Birth control/protection: None     Review of Systems   Constitutional:  Positive for appetite change, fatigue and fever. Negative for chills and diaphoresis.   HENT: Negative.  Negative for congestion, ear pain, hearing loss, rhinorrhea and sore throat.    Eyes: Negative.    Respiratory:  Positive for chest tightness. Negative for cough and shortness of breath.    Cardiovascular: Negative.  Negative for chest pain and palpitations.   Gastrointestinal:  Positive for nausea. Negative for abdominal distention, abdominal pain, blood in stool, constipation, diarrhea and vomiting.   Genitourinary: Negative.  Negative for dysuria, frequency and hematuria.   Musculoskeletal: Negative.   Negative for back pain, joint swelling and neck pain.   Skin: Negative.    Neurological: Negative.  Negative for dizziness, seizures, syncope, weakness, light-headedness and headaches.   Psychiatric/Behavioral: Negative.       Objective:     Vital Signs (Most Recent):  Temp: 100.3 °F (37.9 °C) (10/11/24 1512)  Pulse: 96 (10/11/24 1512)  Resp: (!) 28 (10/11/24 1512)  BP: 124/67 (10/11/24 1512)  SpO2: 96 % (10/11/24 1512) Vital Signs (24h Range):  Temp:  [100.3 °F (37.9 °C)-103.8 °F (39.9 °C)] 100.3 °F (37.9 °C)  Pulse:  [] 96  Resp:  [28-35] 28  SpO2:  [91 %-96 %] 96 %  BP: (124-130)/(58-75) 124/67     Weight: 90.7 kg (200 lb)  Body mass index is 29.53 kg/m².     Physical Exam  Constitutional:       General: He is not in acute distress.     Appearance: Normal appearance. He is ill-appearing.   HENT:      Head: Normocephalic.      Nose: Nose normal.   Eyes:      Extraocular Movements: Extraocular movements intact.   Cardiovascular:      Rate and Rhythm: Normal rate and regular rhythm.      Pulses: Normal pulses.      Heart sounds: Normal heart sounds. No murmur heard.     No friction rub. No gallop.   Pulmonary:      Effort: Pulmonary effort is normal. No respiratory distress.      Breath sounds: Examination of the left-middle field reveals decreased breath sounds. Decreased breath sounds present. No wheezing or rales.   Abdominal:      General: Bowel sounds are normal. There is no distension.      Palpations: Abdomen is soft.      Tenderness: There is no abdominal tenderness. There is no guarding.   Musculoskeletal:         General: No swelling. Normal range of motion.      Cervical back: Normal range of motion. No rigidity or tenderness.   Skin:     General: Skin is warm.   Neurological:      General: No focal deficit present.      Mental Status: He is alert and oriented to person, place, and time.   Psychiatric:         Mood and Affect: Mood normal.         Behavior: Behavior normal.               "  Significant Labs: All pertinent labs within the past 24 hours have been reviewed.  CBC:   Recent Labs   Lab 10/11/24  1330   WBC 8.90   HGB 13.7*   HCT 43.8        CMP:   Recent Labs   Lab 10/11/24  1330   *   K 3.7      CO2 23      BUN 12   CREATININE 1.2   CALCIUM 9.7   PROT 7.7   ALBUMIN 4.2   BILITOT 0.9   ALKPHOS 45*   AST 23   ALT 32   ANIONGAP 10     Lactic Acid: No results for input(s): "LACTATE" in the last 48 hours.  TSH: No results for input(s): "TSH" in the last 4320 hours.    Significant Imaging: I have reviewed all pertinent imaging results/findings within the past 24 hours.  Assessment/Plan:     * Sepsis  Emilio Gallagher is a 29 y.o. male with hx of being a smoker presenting with SOB and fever. Patient has been fevering since Monday with a high of 105, experiencing SOB, and coughing with some green tinged sputum and a single episode of hemoptysis. Patient was given albuterol and Augmentin from the urgent care but has not gotten better.    Plan:   - BC x 2; pending  - CXR with no acute findings  - CT abdomen shows multifocal ground glass/consolidation concerning for multifocal infectious or inflammatory process  - Will start Azithromycin 500mg x 3 days  - Will start Ceftriaxone 1g x 5 days  - lactate normal at 1.51        Heartburn  Plan:   - patient has hx of heartburn  - takes approximately 6 tums daily  - give antacids prn      Acute hypoxic respiratory failure  Patient with Hypoxic Respiratory failure which is Acute.  he is not on home oxygen. Supplemental oxygen was provided and noted-      .   Signs/symptoms of respiratory failure include- tachypnea, increased work of breathing, and SOB . Contributing diagnoses includes -  sepsis  Labs and images were reviewed. Patient Has not had a recent ABG. Will treat underlying causes and adjust management of respiratory failure as follows    Plan:   - pulse ox of 91% on admission  - patient requiring supplemental O2   - " currently on 3L O2 NC  - continue to monitor  - goal is to wean off of O2    Constipation  Plan:  - start miralax daily        VTE Risk Mitigation (From admission, onward)           Ordered     IP VTE LOW RISK PATIENT  Once         10/11/24 1555     Place sequential compression device  Until discontinued         10/11/24 1555                           On 10/11/2024, patient should be placed in hospital observation services under my care in collaboration with Dr. Lopez.         Valentine Reynaga MD  PGY-1  Department of Hospital Medicine  [facility] - Emergency Dept

## 2024-10-11 NOTE — HPI
Emilio Gallagher is a 29 y.o. male with no significant PMH besides being a smoker, and family hx of MI presenting to the ED for SOB and fever. Per patient, fever has reached a Tmax of 105. He reports going mudriding on Saturday and feeling well until that evening when he noticed his taste was altered and has been since that evening. He had isolated vomiting episodes on 10/7 and 10/8 with emesis being lime green in color. Had 1 episode of small hemoptysis. Patient reports fevers minimally decreased with tylenol and ibuprofen to a low of 101. He visited an urgent care on 10/9 and was diagnosed with possible pneumonia and given an albuterol inhaler for SOB and Augmentin. On the same day, he went to outside ER and was diagnosed with left lower lobe pneumonia and was told to continue Augmentin. Patient is a current smoker with 7 pack years. He denies any recent travel, recent sick contacts, or illicit drug use. Does have a pet dog at home. Patient does have c/o chest pressure and SOB requiring 3L O2.     ED course: BP: 124/58, HR: 128, RR: 28, T: 103.8 O2: 91%. CBC and CMP completed. Blood cultures x2 collected. Troponin and BNP negative. HIV 1/2 Ab are nonreactive. Hep C Ab nonreactive. No detection of Covid, Influenza, or RSV. Lactate of 1.51. CXR shows clear lungs and no pleural effusion. CT abdomen/pelvis reading in process. Patient given fluids and started on vancomycin, azithromycin, and ceftriaxone. UA is clean.

## 2024-10-11 NOTE — ASSESSMENT & PLAN NOTE
Emilio Gallagher is a 29 y.o. male with hx of being a smoker presenting with SOB and fever. Patient has been fevering since Monday with a high of 105, experiencing SOB, and coughing with some green tinged sputum and a single episode of hemoptysis. Patient was given albuterol and Augmentin from the urgent care but has not gotten better.    Plan:   - BC x 2; pending  - CXR with no acute findings  - CT abdomen shows multifocal ground glass/consolidation concerning for multifocal infectious or inflammatory process  - Will start Azithromycin 500mg x 3 days  - Will start Ceftriaxone 1g x 5 days  - lactate normal at 1.51

## 2024-10-11 NOTE — ED PROVIDER NOTES
Encounter Date: 10/11/2024       History     Chief Complaint   Patient presents with    Pneumonia     On tues told pneumonia, seen twice, running fever, and sob,     29-year-old male with no significant past medical history presents for persistent high fever with shortness of breath for about 5 days.  Symptoms started on Sunday with wheezing followed by high fever up to 105° F the day thereafter.  His temp is been elevated persistently since then, he has been taking ibuprofen without significant improvement in the lowest his temperature has been has been 101° F. Reports associated shortness of breath as well as cough which is occasionally been productive of bloody sputum.  States that both of his feet felt numb yesterday but this has since resolved.  Reports associated nausea but no vomiting in his numbness in his urine appears very yellow.  He was treated for left lower lobe pneumonia with Augmentin starting 2 days ago without any improvement.  No dysuria, hematuria, abdominal pain, rash or changes in bowel movements.  No sick contacts or recent travel.  Family member notes that he recently went mild running.  No prior similar episodes.    The history is provided by the patient and medical records. No  was used.     Review of patient's allergies indicates:  No Known Allergies  History reviewed. No pertinent past medical history.  Past Surgical History:   Procedure Laterality Date    APPENDECTOMY       Family History   Problem Relation Name Age of Onset    No Known Problems Mother      No Known Problems Father       Social History     Tobacco Use    Smoking status: Every Day     Current packs/day: 0.50     Types: Cigarettes    Smokeless tobacco: Never   Substance Use Topics    Alcohol use: No    Drug use: Never     Review of Systems    Physical Exam     Initial Vitals [10/11/24 1304]   BP Pulse Resp Temp SpO2   (!) 124/58 (!) 128 (!) 28 (!) 103.8 °F (39.9 °C) (!) 91 %      MAP       --          Physical Exam    Nursing note and vitals reviewed.  Constitutional: He appears well-developed and well-nourished. He appears listless. He is not diaphoretic. He appears ill. No distress.   HENT:   Head: Normocephalic and atraumatic.   Neck: Neck supple.   Normal range of motion.  Cardiovascular:  Regular rhythm, normal heart sounds and intact distal pulses.     Exam reveals no gallop and no friction rub.       No murmur heard.  Tachycardic   Pulmonary/Chest: Tachypnea noted. No respiratory distress. He has decreased breath sounds in the left lower field. He has wheezes in the left lower field. He has no rhonchi. He has no rales. He exhibits no tenderness.   Tachypneic with increased work of breathing   Abdominal: Abdomen is soft. He exhibits no distension and no mass. There is no abdominal tenderness. There is no rebound and no guarding.   Musculoskeletal:         General: Normal range of motion.      Cervical back: Normal range of motion and neck supple.     Neurological: He is oriented to person, place, and time. He appears listless.   Skin: Skin is warm and dry.   Psychiatric: He has a normal mood and affect.         ED Course   Procedures  Labs Reviewed   CBC W/ AUTO DIFFERENTIAL - Abnormal       Result Value    WBC 8.90      RBC 7.10 (*)     Hemoglobin 13.7 (*)     Hematocrit 43.8      MCV 62 (*)     MCH 19.3 (*)     MCHC 31.3 (*)     RDW 18.2 (*)     Platelets 232      MPV 9.5      Immature Granulocytes 0.3      Gran # (ANC) 7.2      Immature Grans (Abs) 0.03      Lymph # 0.9 (*)     Mono # 0.7      Eos # 0.0      Baso # 0.05      nRBC 0      Gran % 80.6 (*)     Lymph % 10.2 (*)     Mono % 8.3      Eosinophil % 0.0      Basophil % 0.6      Differential Method Automated      Narrative:     Release to patient->Immediate   COMPREHENSIVE METABOLIC PANEL - Abnormal    Sodium 134 (*)     Potassium 3.7      Chloride 101      CO2 23      Glucose 109      BUN 12      Creatinine 1.2      Calcium 9.7      Total  Protein 7.7      Albumin 4.2      Total Bilirubin 0.9      Alkaline Phosphatase 45 (*)     AST 23      ALT 32      eGFR >60.0      Anion Gap 10      Narrative:     Release to patient->Immediate   PHOSPHORUS - Abnormal    Phosphorus 2.1 (*)     Narrative:     Release to patient->Immediate   PROCALCITONIN - Abnormal    Procalcitonin 0.26 (*)     Narrative:     Release to patient->Immediate   CULTURE, BLOOD   CULTURE, BLOOD   MAGNESIUM    Magnesium 2.1      Narrative:     Release to patient->Immediate   TROPONIN I    Troponin I 0.007      Narrative:     Release to patient->Immediate   B-TYPE NATRIURETIC PEPTIDE    BNP <10      Narrative:     Release to patient->Immediate   CK          Narrative:     Release to patient->Immediate   HIV 1 / 2 ANTIBODY    HIV 1/2 Ag/Ab Non-reactive      Narrative:     Release to patient->Immediate   HEPATITIS C ANTIBODY    Hepatitis C Ab Non-reactive      Narrative:     Release to patient->Immediate   SARS-COV2 (COVID) WITH FLU/RSV BY PCR    SARS-CoV2 (COVID-19) Qualitative PCR Not Detected      Influenza A, Molecular Not Detected      Influenza B, Molecular Not Detected      RSV Ag by Molecular Method Not Detected     URINALYSIS, REFLEX TO URINE CULTURE   LEPTOSPIRA ANTIBODY   URINALYSIS, REFLEX TO URINE CULTURE   TSH   HEMOGLOBIN A1C   ISTAT LACTATE    POC Lactate 1.51      Sample VENOUS      Site Alexander/UAC      Allens Test N/A      DelSys Room Air      Mode SPONT      FiO2 21       EKG Readings: (Independently Interpreted)   Initial Reading: No STEMI. Rhythm: Sinus Tachycardia. Heart Rate: 116. Ectopy: No Ectopy. ST Segments: Normal ST Segments. Clinical Impression: Sinus Tachycardia     ECG Results              EKG 12-lead (Final result)        Collection Time Result Time QRS Duration OHS QTC Calculation    10/11/24 13:06:49 10/11/24 13:46:18 80 397                     Final result by Interface, Lab In [facility] (10/11/24 13:46:26)                   Narrative:    Test Reason :  R00.0,    Vent. Rate : 116 BPM     Atrial Rate : 116 BPM     P-R Int : 132 ms          QRS Dur : 080 ms      QT Int : 286 ms       P-R-T Axes : 043 056 012 degrees     QTc Int : 397 ms    Sinus tachycardia  Otherwise normal ECG  No previous ECGs available  Confirmed by uPma Saravia MD ([id number]) on 10/11/2024 1:46:16 PM    Referred By: [email or web address]   SELF           Confirmed By:Puma Saravia MD                                  Imaging Results              CT Abdomen Pelvis With IV Contrast NO Oral Contrast (In process)                      CTA Chest Non-Coronary (PE Studies) (In process)                      X-Ray Chest AP Portable (Final result)  Result time 10/11/24 14:50:47      Final result by Igor Comer MD (10/11/24 14:50:47)                   Impression:      See above      Electronically signed by: Igor Comer MD  Date:    10/11/2024  Time:    14:50               Narrative:    EXAMINATION:  XR CHEST AP PORTABLE    CLINICAL HISTORY:  Sepsis;    TECHNIQUE:  Single frontal view of the chest was performed.    COMPARISON:  None    FINDINGS:  Heart size normal.  The lungs are clear.  No pleural effusion                                       Medications   vancomycin 1.75 g in 5 % dextrose 500 mL IVPB (has no administration in time range)   sodium chloride 0.9% flush 10 mL (has no administration in time range)   naloxone 0.4 mg/mL injection 0.02 mg (has no administration in time range)   glucose chewable tablet 16 g (has no administration in time range)   glucose chewable tablet 24 g (has no administration in time range)   glucagon (human recombinant) injection 1 mg (has no administration in time range)   dextrose 10% bolus 125 mL 125 mL (has no administration in time range)   dextrose 10% bolus 250 mL 250 mL (has no administration in time range)   lactated ringers bolus 2,121 mL (2,121 mLs Intravenous New Bag 10/11/24 1400)   acetaminophen tablet 1,000 mg (1,000 mg Oral Given 10/11/24 1349)   ketorolac injection  9.999 mg (9.999 mg Intravenous Given 10/11/24 1353)   cefTRIAXone (ROCEPHIN) 2 g in D5W 100 mL IVPB (MB+) (0 g Intravenous Stopped 10/11/24 1439)   azithromycin (ZITHROMAX) 500 mg in D5W 250 mL  IVPB (admixture device) (500 mg Intravenous New Bag 10/11/24 1451)   iohexoL (OMNIPAQUE 350) injection 100 mL (100 mLs Intravenous Given 10/11/24 1613)     Medical Decision Making  29-year-old male presenting for worsening symptoms after recent treatment for left lower lobe pneumonia.  He is febrile at 103.8° F, hypoxic at 91% on room air, tachypneic respiratory rate of 20, tachycardic with heart rate of 128, /58.  Ill-appearing.    Differential diagnosis:  Initial concern for sepsis due to pneumonia   Recently had negative flu/COVID testing but viral cause is certainly possible   Dehydration   Electrolyte derangement   I do think other cause of sepsis is certainly possible, bacteremia but no obvious localizing symptoms    Will do septic workup, give 30 cc/kg fluid bolus for ideal body weight, antibiotics antipyretics and reassess.    Interestingly, chest x-ray does not show a focal pneumonia.  Doubt PE but CTA obtained to rule out and also to further characterize lung infection.  CT abdomen pelvis added on due to concern for occult infectious source.  Antibiotics broadened to include vancomycin.  Patient will be admitted to Hospital Medicine for further management.  Patient and family comfortable with admission.  I discussed this patient with my supervising physician.    This patient does have evidence of infective focus  My overall impression is sepsis.  Source: Respiratory  Antibiotics given- Antibiotics (72h ago, onward)    Start     Stop Route Frequency Ordered    10/11/24 1515  vancomycin 1.75 g in 5 % dextrose 500 mL IVPB         10/12/24 0314 IV ED 1 Time 10/11/24 1501      Latest lactate reviewed-  Lab             10/11/24                       1357          POCLAC       1.51          Organ dysfunction  indicated by N/A    Fluid challenge Ideal Body Weight- The patient's ideal body weight is Ideal body weight: 70.7 kg (155 lb 13.8 oz) which will be used to calculate fluid bolus of 30 ml/kg for treatment of septic shock.      Post- resuscitation assessment Yes Perfusion exam was performed within 6 hours of septic shock presentation after bolus shows Adequate tissue perfusion assessed by non-invasive monitoring       Will Start Pressors- Levophed for MAP of 65  Source control achieved by: N/A      Amount and/or Complexity of Data Reviewed  External Data Reviewed: radiology and notes.  Labs: ordered. Decision-making details documented in ED Course.  Radiology: ordered and independent interpretation performed. Decision-making details documented in ED Course.  ECG/medicine tests: ordered and independent interpretation performed.    Risk  OTC drugs.  Prescription drug management.  Decision regarding hospitalization.              Attending Attestation:     Physician Attestation Statement for NP/PA:   I personally made/approved the management plan and take responsibility for the patient management.    Other NP/PA Attestation Additions:      Medical Decision Makin yo male presenting with persistent fevers, cough, recently started on Augmentin for PNA. Has been taking ibuprofen at home. Denies recent travel.   Felt paresthesias in his legs, but this has resolved. Denies known asthma/COPD, does smoke cigarettes, but stopped this week when he was feeling unwell.     On exam, nasal canula in place. No meningismus.  Coarse breath sounds with mild wheeze. Abdomen non-tender.  LE perfused, 2+DP pulses, full strength and sensation.     EKG on my independent interpretation: No prior for comparison, sinus tachycardia, rate 116, no STEMI.     Agree with sepsis w/u and abx.  Lactate WNL. No leukocytosis. Viral swabs pending and will proceed with advanced imaging for further infectious w/u.  Nonfocal neuro exam, lower suspicion for  spinal cord emergency.  Signed out at 3p to oncoming attending with advanced imaging pending, plan for admission to continue abx, trend culture data, further infectious w/u.                ED Course as of 10/11/24 1621   Fri Oct 11, 2024   1404 POC Lactate: 1.51 [CC]   1411 WBC: 8.90 [CC]   1411 Lymph #(!): 0.9 [CC]   1426 WBC: 8.90  No leukocytosis  [AB]   1426 POC Lactate: 1.51 [AB]   1438 BNP: <10 [CC]   1438 Troponin I: 0.007 [CC]   1442 Creatinine: 1.2 [CC]   1442 BILIRUBIN TOTAL: 0.9 [CC]   1454 Procalcitonin(!): 0.26 [CC]   1457 X-Ray Chest AP Portable  No consolidation [CC]   1538 Temp: 100.3 °F (37.9 °C) [CC]      ED Course User Index  [AB] Adan Roldan MD  [CC] Lisbet Abobtt PA-C                           Clinical Impression:  Final diagnoses:  [R00.0] Tachycardia  [A41.9] Sepsis, due to unspecified organism, unspecified whether acute organ dysfunction present (Primary)  [R50.9] Fever, unspecified fever cause          ED Disposition Condition    Observation                 Lisbet Abbott PA-C  10/11/24 1621       Adan Roldan MD  10/11/24 4502

## 2024-10-11 NOTE — SUBJECTIVE & OBJECTIVE
History reviewed. No pertinent past medical history.    Past Surgical History:   Procedure Laterality Date    APPENDECTOMY         Review of patient's allergies indicates:  No Known Allergies    No current facility-administered medications on file prior to encounter.     Current Outpatient Medications on File Prior to Encounter   Medication Sig    albuterol (PROVENTIL HFA) 90 mcg/actuation inhaler Inhale 2 puffs into the lungs every 4 (four) hours as needed for Wheezing. Rescue    amoxicillin-clavulanate 875-125mg (AUGMENTIN) 875-125 mg per tablet Take 1 tablet by mouth every 12 (twelve) hours. for 7 days    ibuprofen (ADVIL,MOTRIN) 800 MG tablet Take 1 tablet (800 mg total) by mouth every 6 (six) hours as needed for Pain.    cetirizine (ZYRTEC) 10 MG tablet Take 1 tablet (10 mg total) by mouth once daily.    clobetasol (TEMOVATE) 0.05 % cream Apply topically 2 (two) times daily.     Family History       Problem Relation (Age of Onset)    No Known Problems Mother, Father          Tobacco Use    Smoking status: Every Day     Current packs/day: 0.50     Types: Cigarettes    Smokeless tobacco: Never   Substance and Sexual Activity    Alcohol use: No    Drug use: Never    Sexual activity: Yes     Birth control/protection: None     Review of Systems   Constitutional:  Positive for appetite change, fatigue and fever. Negative for chills and diaphoresis.   HENT: Negative.  Negative for congestion, ear pain, hearing loss, rhinorrhea and sore throat.    Eyes: Negative.    Respiratory:  Positive for chest tightness. Negative for cough and shortness of breath.    Cardiovascular: Negative.  Negative for chest pain and palpitations.   Gastrointestinal:  Positive for nausea. Negative for abdominal distention, abdominal pain, blood in stool, constipation, diarrhea and vomiting.   Genitourinary: Negative.  Negative for dysuria, frequency and hematuria.   Musculoskeletal: Negative.  Negative for back pain, joint swelling and neck  pain.   Skin: Negative.    Neurological: Negative.  Negative for dizziness, seizures, syncope, weakness, light-headedness and headaches.   Psychiatric/Behavioral: Negative.       Objective:     Vital Signs (Most Recent):  Temp: 100.3 °F (37.9 °C) (10/11/24 1512)  Pulse: 96 (10/11/24 1512)  Resp: (!) 28 (10/11/24 1512)  BP: 124/67 (10/11/24 1512)  SpO2: 96 % (10/11/24 1512) Vital Signs (24h Range):  Temp:  [100.3 °F (37.9 °C)-103.8 °F (39.9 °C)] 100.3 °F (37.9 °C)  Pulse:  [] 96  Resp:  [28-35] 28  SpO2:  [91 %-96 %] 96 %  BP: (124-130)/(58-75) 124/67     Weight: 90.7 kg (200 lb)  Body mass index is 29.53 kg/m².     Physical Exam  Constitutional:       General: He is not in acute distress.     Appearance: Normal appearance. He is ill-appearing.   HENT:      Head: Normocephalic.      Nose: Nose normal.   Eyes:      Extraocular Movements: Extraocular movements intact.   Cardiovascular:      Rate and Rhythm: Normal rate and regular rhythm.      Pulses: Normal pulses.      Heart sounds: Normal heart sounds. No murmur heard.     No friction rub. No gallop.   Pulmonary:      Effort: Pulmonary effort is normal. No respiratory distress.      Breath sounds: Examination of the left-middle field reveals decreased breath sounds. Decreased breath sounds present. No wheezing or rales.   Abdominal:      General: Bowel sounds are normal. There is no distension.      Palpations: Abdomen is soft.      Tenderness: There is no abdominal tenderness. There is no guarding.   Musculoskeletal:         General: No swelling. Normal range of motion.      Cervical back: Normal range of motion. No rigidity or tenderness.   Skin:     General: Skin is warm.   Neurological:      General: No focal deficit present.      Mental Status: He is alert and oriented to person, place, and time.   Psychiatric:         Mood and Affect: Mood normal.         Behavior: Behavior normal.                Significant Labs: All pertinent labs within the past 24  "hours have been reviewed.  CBC:   Recent Labs   Lab 10/11/24  1330   WBC 8.90   HGB 13.7*   HCT 43.8        CMP:   Recent Labs   Lab 10/11/24  1330   *   K 3.7      CO2 23      BUN 12   CREATININE 1.2   CALCIUM 9.7   PROT 7.7   ALBUMIN 4.2   BILITOT 0.9   ALKPHOS 45*   AST 23   ALT 32   ANIONGAP 10     Lactic Acid: No results for input(s): "LACTATE" in the last 48 hours.  TSH: No results for input(s): "TSH" in the last 4320 hours.    Significant Imaging: I have reviewed all pertinent imaging results/findings within the past 24 hours.  "

## 2024-10-12 PROBLEM — Z87.891 SMOKING HISTORY: Status: ACTIVE | Noted: 2024-10-12

## 2024-10-12 PROBLEM — F17.200 TOBACCO DEPENDENCY: Status: ACTIVE | Noted: 2024-10-12

## 2024-10-12 LAB
ALBUMIN SERPL BCP-MCNC: 3.4 G/DL (ref 3.5–5.2)
ALP SERPL-CCNC: 36 U/L (ref 55–135)
ALT SERPL W/O P-5'-P-CCNC: 29 U/L (ref 10–44)
ANION GAP SERPL CALC-SCNC: 9 MMOL/L (ref 8–16)
AST SERPL-CCNC: 22 U/L (ref 10–40)
BASOPHILS # BLD AUTO: 0.06 K/UL (ref 0–0.2)
BASOPHILS NFR BLD: 0.8 % (ref 0–1.9)
BILIRUB SERPL-MCNC: 0.6 MG/DL (ref 0.1–1)
BUN SERPL-MCNC: 7 MG/DL (ref 6–20)
CALCIUM SERPL-MCNC: 8.8 MG/DL (ref 8.7–10.5)
CHLORIDE SERPL-SCNC: 103 MMOL/L (ref 95–110)
CO2 SERPL-SCNC: 25 MMOL/L (ref 23–29)
CREAT SERPL-MCNC: 1 MG/DL (ref 0.5–1.4)
CRP SERPL-MCNC: 102.1 MG/L (ref 0–8.2)
DIFFERENTIAL METHOD BLD: ABNORMAL
EOSINOPHIL # BLD AUTO: 0 K/UL (ref 0–0.5)
EOSINOPHIL NFR BLD: 0 % (ref 0–8)
ERYTHROCYTE [DISTWIDTH] IN BLOOD BY AUTOMATED COUNT: 16.3 % (ref 11.5–14.5)
ERYTHROCYTE [SEDIMENTATION RATE] IN BLOOD BY PHOTOMETRIC METHOD: 23 MM/HR (ref 0–23)
EST. GFR  (NO RACE VARIABLE): >60 ML/MIN/1.73 M^2
GLUCOSE SERPL-MCNC: 110 MG/DL (ref 70–110)
HCT VFR BLD AUTO: 36 % (ref 40–54)
HGB BLD-MCNC: 11.3 G/DL (ref 14–18)
IMM GRANULOCYTES # BLD AUTO: 0.02 K/UL (ref 0–0.04)
IMM GRANULOCYTES NFR BLD AUTO: 0.3 % (ref 0–0.5)
LACTATE SERPL-SCNC: 1.1 MMOL/L (ref 0.5–2.2)
LYMPHOCYTES # BLD AUTO: 1.4 K/UL (ref 1–4.8)
LYMPHOCYTES NFR BLD: 18.3 % (ref 18–48)
MAGNESIUM SERPL-MCNC: 2 MG/DL (ref 1.6–2.6)
MCH RBC QN AUTO: 19.4 PG (ref 27–31)
MCHC RBC AUTO-ENTMCNC: 31.4 G/DL (ref 32–36)
MCV RBC AUTO: 62 FL (ref 82–98)
MONOCYTES # BLD AUTO: 0.7 K/UL (ref 0.3–1)
MONOCYTES NFR BLD: 9.4 % (ref 4–15)
MRSA SCREEN BY PCR: NOT DETECTED
NEUTROPHILS # BLD AUTO: 5.6 K/UL (ref 1.8–7.7)
NEUTROPHILS NFR BLD: 71.2 % (ref 38–73)
NRBC BLD-RTO: 0 /100 WBC
OHS QRS DURATION: 82 MS
OHS QTC CALCULATION: 410 MS
PLATELET # BLD AUTO: 214 K/UL (ref 150–450)
PMV BLD AUTO: 10.2 FL (ref 9.2–12.9)
POTASSIUM SERPL-SCNC: 3.3 MMOL/L (ref 3.5–5.1)
PROT SERPL-MCNC: 6.3 G/DL (ref 6–8.4)
RBC # BLD AUTO: 5.83 M/UL (ref 4.6–6.2)
SODIUM SERPL-SCNC: 137 MMOL/L (ref 136–145)
WBC # BLD AUTO: 7.87 K/UL (ref 3.9–12.7)

## 2024-10-12 PROCEDURE — 36415 COLL VENOUS BLD VENIPUNCTURE: CPT

## 2024-10-12 PROCEDURE — 63600175 PHARM REV CODE 636 W HCPCS

## 2024-10-12 PROCEDURE — 25000003 PHARM REV CODE 250

## 2024-10-12 PROCEDURE — 20600001 HC STEP DOWN PRIVATE ROOM

## 2024-10-12 PROCEDURE — 63700000 PHARM REV CODE 250 ALT 637 W/O HCPCS

## 2024-10-12 PROCEDURE — 27000221 HC OXYGEN, UP TO 24 HOURS

## 2024-10-12 PROCEDURE — 25000242 PHARM REV CODE 250 ALT 637 W/ HCPCS

## 2024-10-12 PROCEDURE — 94640 AIRWAY INHALATION TREATMENT: CPT

## 2024-10-12 PROCEDURE — 96365 THER/PROPH/DIAG IV INF INIT: CPT | Mod: 59

## 2024-10-12 PROCEDURE — 99900035 HC TECH TIME PER 15 MIN (STAT)

## 2024-10-12 PROCEDURE — 80053 COMPREHEN METABOLIC PANEL: CPT

## 2024-10-12 PROCEDURE — 83735 ASSAY OF MAGNESIUM: CPT

## 2024-10-12 PROCEDURE — 83605 ASSAY OF LACTIC ACID: CPT

## 2024-10-12 PROCEDURE — 94761 N-INVAS EAR/PLS OXIMETRY MLT: CPT

## 2024-10-12 PROCEDURE — 85652 RBC SED RATE AUTOMATED: CPT

## 2024-10-12 PROCEDURE — 96366 THER/PROPH/DIAG IV INF ADDON: CPT

## 2024-10-12 PROCEDURE — 87641 MR-STAPH DNA AMP PROBE: CPT

## 2024-10-12 PROCEDURE — 86140 C-REACTIVE PROTEIN: CPT

## 2024-10-12 PROCEDURE — 85025 COMPLETE CBC W/AUTO DIFF WBC: CPT

## 2024-10-12 RX ORDER — GUAIFENESIN 600 MG/1
600 TABLET, EXTENDED RELEASE ORAL 2 TIMES DAILY
Status: DISCONTINUED | OUTPATIENT
Start: 2024-10-12 | End: 2024-10-14 | Stop reason: HOSPADM

## 2024-10-12 RX ORDER — TALC
6 POWDER (GRAM) TOPICAL NIGHTLY PRN
Status: DISCONTINUED | OUTPATIENT
Start: 2024-10-13 | End: 2024-10-14 | Stop reason: HOSPADM

## 2024-10-12 RX ORDER — POTASSIUM CHLORIDE 20 MEQ/1
40 TABLET, EXTENDED RELEASE ORAL ONCE
Status: COMPLETED | OUTPATIENT
Start: 2024-10-12 | End: 2024-10-12

## 2024-10-12 RX ORDER — IBUPROFEN 200 MG
1 TABLET ORAL DAILY
Status: DISCONTINUED | OUTPATIENT
Start: 2024-10-13 | End: 2024-10-14 | Stop reason: HOSPADM

## 2024-10-12 RX ADMIN — GUAIFENESIN 600 MG: 600 TABLET, EXTENDED RELEASE ORAL at 01:10

## 2024-10-12 RX ADMIN — IPRATROPIUM BROMIDE AND ALBUTEROL SULFATE 3 ML: 2.5; .5 SOLUTION RESPIRATORY (INHALATION) at 01:10

## 2024-10-12 RX ADMIN — IPRATROPIUM BROMIDE AND ALBUTEROL SULFATE 3 ML: 2.5; .5 SOLUTION RESPIRATORY (INHALATION) at 09:10

## 2024-10-12 RX ADMIN — ENOXAPARIN SODIUM 40 MG: 40 INJECTION SUBCUTANEOUS at 06:10

## 2024-10-12 RX ADMIN — VANCOMYCIN HYDROCHLORIDE 1750 MG: 10 INJECTION, POWDER, LYOPHILIZED, FOR SOLUTION INTRAVENOUS at 06:10

## 2024-10-12 RX ADMIN — POLYETHYLENE GLYCOL 3350 17 G: 17 POWDER, FOR SOLUTION ORAL at 10:10

## 2024-10-12 RX ADMIN — VANCOMYCIN HYDROCHLORIDE 1750 MG: 10 INJECTION, POWDER, LYOPHILIZED, FOR SOLUTION INTRAVENOUS at 05:10

## 2024-10-12 RX ADMIN — IPRATROPIUM BROMIDE AND ALBUTEROL SULFATE 3 ML: 2.5; .5 SOLUTION RESPIRATORY (INHALATION) at 11:10

## 2024-10-12 RX ADMIN — CEFTRIAXONE 1 G: 1 INJECTION, POWDER, FOR SOLUTION INTRAMUSCULAR; INTRAVENOUS at 01:10

## 2024-10-12 RX ADMIN — GUAIFENESIN 600 MG: 600 TABLET, EXTENDED RELEASE ORAL at 09:10

## 2024-10-12 RX ADMIN — IPRATROPIUM BROMIDE AND ALBUTEROL SULFATE 3 ML: 2.5; .5 SOLUTION RESPIRATORY (INHALATION) at 04:10

## 2024-10-12 RX ADMIN — Medication 6 MG: at 11:10

## 2024-10-12 RX ADMIN — AZITHROMYCIN DIHYDRATE 500 MG: 250 TABLET ORAL at 10:10

## 2024-10-12 RX ADMIN — POTASSIUM CHLORIDE 40 MEQ: 1500 TABLET, EXTENDED RELEASE ORAL at 10:10

## 2024-10-12 NOTE — PLAN OF CARE
Problem: Sepsis/Septic Shock  Goal: Optimal Coping  Outcome: Progressing  Goal: Absence of Bleeding  Outcome: Progressing  Goal: Blood Glucose Level Within Targeted Range  Outcome: Progressing  Goal: Absence of Infection Signs and Symptoms  Outcome: Progressing  Goal: Optimal Nutrition Intake  Outcome: Progressing     Patient A & O x4. VSS. 2L nc. US of abd. IV abx. Family at bedside. Bed low and locked position. Call light within reach.

## 2024-10-12 NOTE — SUBJECTIVE & OBJECTIVE
Interval History: Patient resting in bed. Girlfriend and grandmother at bedside. Mentions that he has a hx of being susceptible to Staph Aureus with one time requiring a one week hospitalization. Denies any chest pain or hemoptysis. Still c/o SOB.    Review of Systems   Constitutional:  Positive for appetite change and fatigue. Negative for chills, diaphoresis and fever.   HENT: Negative.  Negative for congestion, ear pain, hearing loss, rhinorrhea and sore throat.    Eyes: Negative.    Respiratory:  Negative for cough, chest tightness and shortness of breath.    Cardiovascular: Negative.  Negative for chest pain and palpitations.   Gastrointestinal:  Negative for abdominal distention, abdominal pain, blood in stool, constipation, diarrhea, nausea and vomiting.   Genitourinary: Negative.  Negative for dysuria, frequency and hematuria.   Musculoskeletal: Negative.  Negative for back pain, joint swelling and neck pain.   Skin: Negative.    Neurological: Negative.  Negative for dizziness, seizures, syncope, weakness, light-headedness and headaches.   Psychiatric/Behavioral: Negative.       Objective:     Vital Signs (Most Recent):  Temp: 98.8 °F (37.1 °C) (10/12/24 1200)  Pulse: 95 (10/12/24 1200)  Resp: 18 (10/12/24 1200)  BP: (!) 119/59 (10/12/24 1200)  SpO2: 95 % (10/12/24 1404) Vital Signs (24h Range):  Temp:  [98.8 °F (37.1 °C)-103.2 °F (39.6 °C)] 98.8 °F (37.1 °C)  Pulse:  [] 95  Resp:  [17-24] 18  SpO2:  [92 %-100 %] 95 %  BP: (110-140)/(59-70) 119/59     Weight: 90.7 kg (200 lb)  Body mass index is 29.53 kg/m².    Intake/Output Summary (Last 24 hours) at 10/12/2024 1601  Last data filed at 10/11/2024 1859  Gross per 24 hour   Intake 500 ml   Output 1200 ml   Net -700 ml         Physical Exam  Constitutional:       General: He is not in acute distress.     Appearance: Normal appearance. He is ill-appearing.   HENT:      Head: Normocephalic.      Nose: Nose normal.   Eyes:      Extraocular Movements:  Extraocular movements intact.   Cardiovascular:      Rate and Rhythm: Normal rate and regular rhythm.      Pulses: Normal pulses.      Heart sounds: Normal heart sounds. No murmur heard.     No friction rub. No gallop.   Pulmonary:      Effort: Pulmonary effort is normal. No respiratory distress.      Breath sounds: Examination of the left-middle field reveals decreased breath sounds. Decreased breath sounds present. No wheezing or rales.   Abdominal:      General: Bowel sounds are normal. There is no distension.      Palpations: Abdomen is soft.      Tenderness: There is no abdominal tenderness. There is no guarding.   Musculoskeletal:         General: No swelling. Normal range of motion.      Cervical back: Normal range of motion. No rigidity or tenderness.   Skin:     General: Skin is warm.   Neurological:      General: No focal deficit present.      Mental Status: He is alert and oriented to person, place, and time.   Psychiatric:         Mood and Affect: Mood normal.         Behavior: Behavior normal.             Significant Labs: All pertinent labs within the past 24 hours have been reviewed.  CBC:   Recent Labs   Lab 10/11/24  1330 10/12/24  0443   WBC 8.90 7.87   HGB 13.7* 11.3*   HCT 43.8 36.0*    214     CMP:   Recent Labs   Lab 10/11/24  1330 10/12/24  0443   * 137   K 3.7 3.3*    103   CO2 23 25    110   BUN 12 7   CREATININE 1.2 1.0   CALCIUM 9.7 8.8   PROT 7.7 6.3   ALBUMIN 4.2 3.4*   BILITOT 0.9 0.6   ALKPHOS 45* 36*   AST 23 22   ALT 32 29   ANIONGAP 10 9       Significant Imaging: I have reviewed all pertinent imaging results/findings within the past 24 hours.

## 2024-10-12 NOTE — ASSESSMENT & PLAN NOTE
Patient with Hypoxic Respiratory failure which is Acute.  he is not on home oxygen. Supplemental oxygen was provided and noted-      .   Signs/symptoms of respiratory failure include- tachypnea, increased work of breathing, and SOB . Contributing diagnoses includes -  sepsis  Labs and images were reviewed. Patient Has not had a recent ABG. Will treat underlying causes and adjust management of respiratory failure as follows    Plan:   - pulse ox of 91% on admission  - patient requiring supplemental O2   - currently on 3L O2 NC  - scheduled duonebs q6  - incentive spirometer use  - continue to monitor  - goal is to wean off of O2

## 2024-10-12 NOTE — PROGRESS NOTES
[facility] ICU Progress Note    Admit Date: 2018  POD:  1 Day Post-Op    Procedure:  Procedure(s):  CORONARY ARTERY BYPASS GRAFTING X 3 WITH LIMA, CARMEN, RESVGH, ECC, DAVIS AND EPIAORTIC ULTRASOUND BY DR. Sam Chadwick        Subjective:   Pt seen with Dr. Valarie Shine. Afebrile, on 4 L NC. On amio 1, dobut 2, insulin. Objective:   Vitals:  Blood pressure 102/67, pulse 96, temperature 99.4 °F (37.4 °C), resp. rate (!) 56, height 5' (1.524 m), weight 213 lb 13.5 oz (97 kg), last menstrual period 2010, SpO2 96 %, not currently breastfeeding. Temp (24hrs), Av.4 °F (36.9 °C), Min:97.4 °F (36.3 °C), Max:99.4 °F (37.4 °C)    Hemodynamics:   CO: CO (l/min): 5 l/min   CI: CI (l/min/m2): 2.6 l/min/m2   CVP: CVP (mmHg): 16 mmHg (18 0800)   SVR: SVR (dyne*sec)/cm5: 1273 (dyne*sec)/cm5 (18 7184)   PAP Systolic: PAP Systolic: 34 ( 5617)   PAP Diastolic: PAP Diastolic: 17 (/ 9390)   PVR:     SV02: SVO2 (%): 49 % (18 0700)   SCV02: SCVO2 (%): 69 % (18 1900)    EKG/Rhythm:   ST 100s    Extubation Date / Time: 18 at 1810    CT Output: 710 ml     Ventilator:  Ventilator Volumes  Vt Set (ml): 400 ml (18 1448)  Vt Exhaled (Machine Breath) (ml): 407 ml (18 1448)  Vt Spont (ml): 387 ml (18)  Ve Observed (l/min): 7.9 l/min (18)    Oxygen Therapy:  Oxygen Therapy  O2 Sat (%): 96 % (18 0800)  Pulse via Oximetry: 96 beats per minute (18 0800)  O2 Device: Nasal cannula (18 0800)  O2 Flow Rate (L/min): 4 l/min (18 0800)  O2 Temperature: 35.6 °F (2 °C) (18 1039)  FIO2 (%): 40 % (18 1945)    CXR:    CXR Results  (Last 48 hours)               18 1449  XR CHEST PORT Final result    Impression:  IMPRESSION:   1. ET tube is in satisfactory position.    2. Bibasilar areas of atelectasis are noted and low lung volumes       Narrative:  EXAM:  XR CHEST PORT       INDICATION:  postop heart surgery, coronary artery disease, CABG x3 [facility]down Flex ([place] [id number])  [facility] Medicine  Progress Note    Patient Name: Emilio Gallagher  MRN: [id number]  Patient Class: IP- Inpatient   Admission Date: 10/11/2024  Length of Stay: 1 days  Attending Physician: Mary Alice Lopez MD  Primary Care Provider: Una, Primary Doctor        Subjective:     Principal Problem:Sepsis        HPI:  Emilio Gallagher is a 29 y.o. male with no significant PMH besides being a smoker, and family hx of MI presenting to the ED for SOB and fever. Per patient, fever has reached a Tmax of 105. He reports going mudriding on Saturday and feeling well until that evening when he noticed his taste was altered and has been since that evening. He had isolated vomiting episodes on 10/7 and 10/8 with emesis being lime green in color. Had 1 episode of small hemoptysis. Patient reports fevers minimally decreased with tylenol and ibuprofen to a low of 101. He visited an urgent care on 10/9 and was diagnosed with possible pneumonia and given an albuterol inhaler for SOB and Augmentin. On the same day, he went to outside ER and was diagnosed with left lower lobe pneumonia and was told to continue Augmentin. Patient is a current smoker with 7 pack years. He denies any recent travel, recent sick contacts, or illicit drug use. Does have a pet dog at home. Patient does have c/o chest pressure and SOB requiring 3L O2.     ED course: BP: 124/58, HR: 128, RR: 28, T: 103.8 O2: 91%. CBC and CMP completed. Blood cultures x2 collected. Troponin and BNP negative. HIV 1/2 Ab are nonreactive. Hep C Ab nonreactive. No detection of Covid, Influenza, or RSV. Lactate of 1.51. CXR shows clear lungs and no pleural effusion. CT abdomen/pelvis reading in process. Patient given fluids and started on vancomycin, azithromycin, and ceftriaxone. UA is clean.    Overview/Hospital Course:  Patient admitted for SOB and fever. Per patient, fever radha to 105. Patient received LR bolus in the ED. CXR looked clear.  COMPARISON:  8/13/2018       FINDINGS: A portable AP radiograph of the chest was obtained at 1444 hours. The   patient is on a cardiac monitor. The patient is status post median sternotomy. ET tube is in satisfactory position as is the [place] catheter. Mediastinal   pleural drains are noted. The lungs demonstrate low lung lines with hypoaerated   bases and mild basilar atelectasis left greater than right. There is also some   atelectasis in left perihilar region. No pneumothorax. No pulmonary edema. Gastric band is noted. Admission Weight: Last Weight   Weight: 215 lb (97.5 kg) Weight: 213 lb 13.5 oz (97 kg)     Intake / Output / Drain:  Current Shift: 08/21 0701 - 08/21 1900  In: 57.1 [I.V.:57.1]  Out: 20 [Drains:20]  Last 24 hrs.:     Intake/Output Summary (Last 24 hours) at 08/21/18 0848  Last data filed at 08/21/18 0800   Gross per 24 hour   Intake          5397.85 ml   Output             4280 ml   Net          1117.85 ml       EXAM:  General:  Resting. Lungs:   Clear to auscultation upper, diminished in bases   Incision:  Drs C/D/I   Heart:  Regular rate and rhythm, S1, S2 normal, no murmur, click, rub or gallop. Abdomen:   Soft, non-tender. Bowel sounds hypoactive. No masses,  No organomegaly. Extremities:  No edema. PPP. Neurologic:  Gross motor and sensory apparatus intact. Labs:   Recent Labs      08/21/18   0826   08/21/18   0419   08/20/18   1450   WBC   --    --   10.4   --   11.7*   HGB   --    --   7.0*   < >  8.8*   HCT   --    --   21.5*   < >  27.3*   PLT   --    --   115*   --   137*   NA   --    --   141   < >  143   K   --    --   4.1   < >  4.4   BUN   --    --   11   < >  12   CREA   --    --   0.92   < >  1.06*   GLU   --    --   109*   < >  154*   GLUCPOC  123*   < >   --    < >   --    INR   --    --    --    --   1.2*    < > = values in this interval not displayed. CT scan showed ground glass consolidation. Started on vancomycin, azithromycin, and ceftriaxone. Troponin negative. D-Dimer slightly elevated. Doppler negative for DVT. Requiring oxygen at 3-4L per nasal cannula. Duonebs started. Incentive spirometer encouraged.     Interval History: Patient resting in bed. Girlfriend and grandmother at bedside. Mentions that he has a hx of being susceptible to Staph Aureus with one time requiring a one week hospitalization. Denies any chest pain or hemoptysis. Still c/o SOB.    Review of Systems   Constitutional:  Positive for appetite change and fatigue. Negative for chills, diaphoresis and fever.   HENT: Negative.  Negative for congestion, ear pain, hearing loss, rhinorrhea and sore throat.    Eyes: Negative.    Respiratory:  Negative for cough, chest tightness and shortness of breath.    Cardiovascular: Negative.  Negative for chest pain and palpitations.   Gastrointestinal:  Negative for abdominal distention, abdominal pain, blood in stool, constipation, diarrhea, nausea and vomiting.   Genitourinary: Negative.  Negative for dysuria, frequency and hematuria.   Musculoskeletal: Negative.  Negative for back pain, joint swelling and neck pain.   Skin: Negative.    Neurological: Negative.  Negative for dizziness, seizures, syncope, weakness, light-headedness and headaches.   Psychiatric/Behavioral: Negative.       Objective:     Vital Signs (Most Recent):  Temp: 98.8 °F (37.1 °C) (10/12/24 1200)  Pulse: 95 (10/12/24 1200)  Resp: 18 (10/12/24 1200)  BP: (!) 119/59 (10/12/24 1200)  SpO2: 95 % (10/12/24 1404) Vital Signs (24h Range):  Temp:  [98.8 °F (37.1 °C)-103.2 °F (39.6 °C)] 98.8 °F (37.1 °C)  Pulse:  [] 95  Resp:  [17-24] 18  SpO2:  [92 %-100 %] 95 %  BP: (110-140)/(59-70) 119/59     Weight: 90.7 kg (200 lb)  Body mass index is 29.53 kg/m².    Intake/Output Summary (Last 24 hours) at 10/12/2024 1601  Last data filed at 10/11/2024 5370  Gross per 24 hour   Intake 500 ml    Output 1200 ml   Net -700 ml         Physical Exam  Constitutional:       General: He is not in acute distress.     Appearance: Normal appearance. He is ill-appearing.   HENT:      Head: Normocephalic.      Nose: Nose normal.   Eyes:      Extraocular Movements: Extraocular movements intact.   Cardiovascular:      Rate and Rhythm: Normal rate and regular rhythm.      Pulses: Normal pulses.      Heart sounds: Normal heart sounds. No murmur heard.     No friction rub. No gallop.   Pulmonary:      Effort: Pulmonary effort is normal. No respiratory distress.      Breath sounds: Examination of the left-middle field reveals decreased breath sounds. Decreased breath sounds present. No wheezing or rales.   Abdominal:      General: Bowel sounds are normal. There is no distension.      Palpations: Abdomen is soft.      Tenderness: There is no abdominal tenderness. There is no guarding.   Musculoskeletal:         General: No swelling. Normal range of motion.      Cervical back: Normal range of motion. No rigidity or tenderness.   Skin:     General: Skin is warm.   Neurological:      General: No focal deficit present.      Mental Status: He is alert and oriented to person, place, and time.   Psychiatric:         Mood and Affect: Mood normal.         Behavior: Behavior normal.             Significant Labs: All pertinent labs within the past 24 hours have been reviewed.  CBC:   Recent Labs   Lab 10/11/24  1330 10/12/24  0443   WBC 8.90 7.87   HGB 13.7* 11.3*   HCT 43.8 36.0*    214     CMP:   Recent Labs   Lab 10/11/24  1330 10/12/24  0443   * 137   K 3.7 3.3*    103   CO2 23 25    110   BUN 12 7   CREATININE 1.2 1.0   CALCIUM 9.7 8.8   PROT 7.7 6.3   ALBUMIN 4.2 3.4*   BILITOT 0.9 0.6   ALKPHOS 45* 36*   AST 23 22   ALT 32 29   ANIONGAP 10 9       Significant Imaging: I have reviewed all pertinent imaging results/findings within the past 24 hours.    Assessment/Plan:      * Sepsis  Emilio Gallagher  Assessment:     Principal Problem:    S/P CABG x 3 (2018)      Overview: Coronary Artery Bypass Grafting x 3, LIMA to LAD, RSVG to Diag, CARMEN Free       Graft Y'd from Vein Graft to OM    Active Problems:    CAD (coronary artery disease) ()      Status post cardiac catheterization (2018)         Plan/Recommendations/Medical Decision Makin. S/p CABG w/ hx of s/p MI/DEVON , last PCI 2017:  Resume plavix when appropriate. On asa, resume statin. No BB until BP can tolerate. Wean dobut for CI > 2.   2. Atelectasis/poor aeration: Wean oxygen for 02 sat > 92%. Increase IS and activity as tolerated. Schedule duonebs, steroid nebs. 3. Hyperlipidemia: resume statin. 4. GERD:  Cont pepcid. 5. Postop anemia s/t acute blood loss: Transfuse 1 unit pRBC now. Monitor H/H and CT outpt. 6. Obesity w/ hx of lap band  7. Anxiety  8. Allergic rhinitis: cont home meds. 9. Pain control: Start IV tylenol x 4 doses. PRN derick. Change to IV dilaudid from IV fentanyl. Pt splinting. Not breathing well. 10. Dispo: PT/OT. Remain in CVI.      Signed By: Carla Montoya NP is a 29 y.o. male with hx of being a smoker presenting with SOB and fever. Patient has been fevering since Monday with a high of 105, experiencing SOB, and coughing with some green tinged sputum and a single episode of hemoptysis. Patient was given albuterol and Augmentin from the urgent care but has not gotten better.    Plan:   - BC x 2; pending  - CXR with no acute findings  - CT abdomen shows multifocal ground glass/consolidation concerning for multifocal infectious or inflammatory process  - Will start Azithromycin 500mg x 3 days  - Will start Ceftriaxone 1g x 5 days  - lactate normal at 1.51        Smoking history  Plan:   - encourage smoking cessation  - nicotine patch daily      Heartburn  Plan:   - patient has hx of heartburn  - takes approximately 6 tums daily  - give antacids prn      Acute hypoxic respiratory failure  Patient with Hypoxic Respiratory failure which is Acute.  he is not on home oxygen. Supplemental oxygen was provided and noted-      .   Signs/symptoms of respiratory failure include- tachypnea, increased work of breathing, and SOB . Contributing diagnoses includes -  sepsis  Labs and images were reviewed. Patient Has not had a recent ABG. Will treat underlying causes and adjust management of respiratory failure as follows    Plan:   - pulse ox of 91% on admission  - patient requiring supplemental O2   - currently on 3L O2 NC  - scheduled duonebs q6  - incentive spirometer use  - continue to monitor  - goal is to wean off of O2    Constipation  Plan:  - start miralax daily        VTE Risk Mitigation (From admission, onward)           Ordered     enoxaparin injection 40 mg  Every 24 hours         10/11/24 2032     IP VTE LOW RISK PATIENT  Once         10/11/24 1555     Place sequential compression device  Until discontinued         10/11/24 1555                    Discharge Planning   ESTHER: 10/14/2024     Code Status: Full Code   Is the patient medically ready for discharge?:     Reason for  patient still in hospital (select all that apply): Pending disposition                     Valentine Reynaga MD  PGY-1  Department of Hospital Medicine   [address] - Stepdown Flex ([place]-14)

## 2024-10-12 NOTE — ED NOTES
"[facility] team aware of pt SPO2 of 92% on 3L with feeling like he is "starving for air" and temp of 101.1.  "

## 2024-10-12 NOTE — HOSPITAL COURSE
Admitted for sepsis (3/4 SIRS criteria - tachycardia, tachypnea, febrile) secondary to pneumonia despite outpatient antibiotics. Patient had CXR done at urgent care on 10/9 which was read as LLL infiltrate. Repeat CXR on 10/11 was clear - in contrast to CT chest which showed bilateral patchy ground-glass opacities. Patient has new oxygen requirement (up to 3L) with complaint of 'feeling starved for oxygen'. COVID/RSV/flu negative. Treated with course of antibiotics for community acquired pneumonia with azithromycin and ceftriaxone/cefpodoxime; vanc dc'd as MRSA nares negative. CTA negative for PE in large arteries, d-dimer slightly elevated but BLE DVT ultrasound negative. Small low attenuating focus in the right hepatic dome seen on CT, RUQUS done showing hemangioma, no abscess. Continue supportive care - including chest physiotherapy and schedule duonebs. Oxygen requirement decreasing, continue weaning. Stable for discharge on 10/14 with PCP referral for close follow-up and smoking cessation counseling. Nicotine patches provided.

## 2024-10-12 NOTE — PROGRESS NOTES
Pharmacokinetic Initial Assessment: IV Vancomycin    Assessment/Plan:    Initiate intravenous vancomycin with loading dose of 1750 mg once followed by a maintenance dose of vancomycin 1750 mg IV every 12 hours  Desired empiric serum trough concentration is 15 to 20 mcg/mL  Draw vancomycin trough level 60 min prior to fourth dose on 10/13/24 at approximately 04:00 or sooner if renal function changes significantly  Pharmacy will continue to follow and monitor vancomycin.      Please contact pharmacy at extension [phone] with any questions regarding this assessment.     Thank you for the consult,   Stephen Barrientos       Patient brief summary:  Emilio Gallagher is a 29 y.o. male initiated on antimicrobial therapy with IV Vancomycin for treatment of suspected sepsis    Drug Allergies:   Review of patient's allergies indicates:  No Known Allergies    Actual Body Weight:   90.7 kg    Renal Function:   Estimated Creatinine Clearance: 102 mL/min (based on SCr of 1.2 mg/dL).,     Dialysis Method (if applicable):  N/A    CBC (last 72 hours):  Recent Labs   Lab Result Units 10/11/24  1330 10/11/24  1657   WBC K/uL 8.90  --    Hemoglobin g/dL 13.7*  --    Hemoglobin A1C %  --  5.1   Hematocrit % 43.8  --    Platelets K/uL 232  --    Gran % % 80.6*  --    Lymph % % 10.2*  --    Mono % % 8.3  --    Eosinophil % % 0.0  --    Basophil % % 0.6  --    Differential Method  Automated  --        Metabolic Panel (last 72 hours):  Recent Labs   Lab Result Units 10/11/24  1330 10/11/24  1659   Sodium mmol/L 134*  --    Potassium mmol/L 3.7  --    Chloride mmol/L 101  --    CO2 mmol/L 23  --    Glucose mg/dL 109  --    Glucose, UA   --  Negative   BUN mg/dL 12  --    Creatinine mg/dL 1.2  --    Albumin g/dL 4.2  --    Total Bilirubin mg/dL 0.9  --    Alkaline Phosphatase U/L 45*  --    AST U/L 23  --    ALT U/L 32  --    Magnesium mg/dL 2.1  --    Phosphorus mg/dL 2.1*  --        Drug levels (last 3 results):  No results for input(s):  ""VANCOMYCINRA", "VANCORANDOM", "VANCOMYCINPE", "VANCOPEAK", "VANCOMYCINTR", "VANCOTROUGH" in the last 72 hours.    Microbiologic Results:  Microbiology Results (last 7 days)       Procedure Component Value Units Date/Time    Blood culture x two cultures. Draw prior to antibiotics. [[id number]] Collected: 10/11/24 1335    Order Status: Sent Specimen: Blood from Peripheral, Antecubital, Left Updated: 10/11/24 1401    Blood culture x two cultures. Draw prior to antibiotics. [[id number]] Collected: 10/11/24 1330    Order Status: Sent Specimen: Blood from Peripheral, Antecubital, Right Updated: 10/11/24 1401            "

## 2024-10-12 NOTE — ED NOTES
Telemetry Verification   Patient placed on Telemetry Box  Verified with War Room  Box # [id number]   Monitor Tech airstrip   Rate 94   Rhythm SR

## 2024-10-13 PROBLEM — D50.9 MICROCYTIC ANEMIA: Status: ACTIVE | Noted: 2024-10-13

## 2024-10-13 LAB
ALBUMIN SERPL BCP-MCNC: 3.5 G/DL (ref 3.5–5.2)
ALP SERPL-CCNC: 35 U/L (ref 55–135)
ALT SERPL W/O P-5'-P-CCNC: 49 U/L (ref 10–44)
ANION GAP SERPL CALC-SCNC: 12 MMOL/L (ref 8–16)
AST SERPL-CCNC: 33 U/L (ref 10–40)
BASOPHILS # BLD AUTO: 0.07 K/UL (ref 0–0.2)
BASOPHILS NFR BLD: 1.1 % (ref 0–1.9)
BILIRUB SERPL-MCNC: 0.6 MG/DL (ref 0.1–1)
BUN SERPL-MCNC: 8 MG/DL (ref 6–20)
CALCIUM SERPL-MCNC: 9 MG/DL (ref 8.7–10.5)
CHLORIDE SERPL-SCNC: 105 MMOL/L (ref 95–110)
CO2 SERPL-SCNC: 24 MMOL/L (ref 23–29)
CREAT SERPL-MCNC: 1 MG/DL (ref 0.5–1.4)
DIFFERENTIAL METHOD BLD: ABNORMAL
EOSINOPHIL # BLD AUTO: 0 K/UL (ref 0–0.5)
EOSINOPHIL NFR BLD: 0.5 % (ref 0–8)
ERYTHROCYTE [DISTWIDTH] IN BLOOD BY AUTOMATED COUNT: 15.9 % (ref 11.5–14.5)
EST. GFR  (NO RACE VARIABLE): >60 ML/MIN/1.73 M^2
FERRITIN SERPL-MCNC: 1254 NG/ML (ref 20–300)
GLUCOSE SERPL-MCNC: 108 MG/DL (ref 70–110)
HCT VFR BLD AUTO: 36.3 % (ref 40–54)
HGB BLD-MCNC: 11.4 G/DL (ref 14–18)
IMM GRANULOCYTES # BLD AUTO: 0.03 K/UL (ref 0–0.04)
IMM GRANULOCYTES NFR BLD AUTO: 0.5 % (ref 0–0.5)
IRON SERPL-MCNC: 32 UG/DL (ref 45–160)
LYMPHOCYTES # BLD AUTO: 1.5 K/UL (ref 1–4.8)
LYMPHOCYTES NFR BLD: 22 % (ref 18–48)
MAGNESIUM SERPL-MCNC: 2.2 MG/DL (ref 1.6–2.6)
MCH RBC QN AUTO: 19.3 PG (ref 27–31)
MCHC RBC AUTO-ENTMCNC: 31.4 G/DL (ref 32–36)
MCV RBC AUTO: 61 FL (ref 82–98)
MONOCYTES # BLD AUTO: 0.6 K/UL (ref 0.3–1)
MONOCYTES NFR BLD: 9.6 % (ref 4–15)
NEUTROPHILS # BLD AUTO: 4.4 K/UL (ref 1.8–7.7)
NEUTROPHILS NFR BLD: 66.3 % (ref 38–73)
NRBC BLD-RTO: 0 /100 WBC
PLATELET # BLD AUTO: 245 K/UL (ref 150–450)
PMV BLD AUTO: 9.8 FL (ref 9.2–12.9)
POTASSIUM SERPL-SCNC: 3.7 MMOL/L (ref 3.5–5.1)
PROT SERPL-MCNC: 6.5 G/DL (ref 6–8.4)
RBC # BLD AUTO: 5.91 M/UL (ref 4.6–6.2)
SATURATED IRON: 18 % (ref 20–50)
SODIUM SERPL-SCNC: 141 MMOL/L (ref 136–145)
TOTAL IRON BINDING CAPACITY: 178 UG/DL (ref 250–450)
TRANSFERRIN SERPL-MCNC: 120 MG/DL (ref 200–375)
VANCOMYCIN TROUGH SERPL-MCNC: 12 UG/ML (ref 10–22)
WBC # BLD AUTO: 6.59 K/UL (ref 3.9–12.7)

## 2024-10-13 PROCEDURE — 25000242 PHARM REV CODE 250 ALT 637 W/ HCPCS

## 2024-10-13 PROCEDURE — 84466 ASSAY OF TRANSFERRIN: CPT

## 2024-10-13 PROCEDURE — 83735 ASSAY OF MAGNESIUM: CPT

## 2024-10-13 PROCEDURE — 83540 ASSAY OF IRON: CPT

## 2024-10-13 PROCEDURE — 82728 ASSAY OF FERRITIN: CPT

## 2024-10-13 PROCEDURE — 25000003 PHARM REV CODE 250

## 2024-10-13 PROCEDURE — 20600001 HC STEP DOWN PRIVATE ROOM

## 2024-10-13 PROCEDURE — 80053 COMPREHEN METABOLIC PANEL: CPT

## 2024-10-13 PROCEDURE — 27000221 HC OXYGEN, UP TO 24 HOURS

## 2024-10-13 PROCEDURE — 63600175 PHARM REV CODE 636 W HCPCS

## 2024-10-13 PROCEDURE — 80202 ASSAY OF VANCOMYCIN: CPT

## 2024-10-13 PROCEDURE — 94761 N-INVAS EAR/PLS OXIMETRY MLT: CPT

## 2024-10-13 PROCEDURE — 99900035 HC TECH TIME PER 15 MIN (STAT)

## 2024-10-13 PROCEDURE — 94640 AIRWAY INHALATION TREATMENT: CPT

## 2024-10-13 PROCEDURE — 36415 COLL VENOUS BLD VENIPUNCTURE: CPT

## 2024-10-13 PROCEDURE — 63600175 PHARM REV CODE 636 W HCPCS: Performed by: STUDENT IN AN ORGANIZED HEALTH CARE EDUCATION/TRAINING PROGRAM

## 2024-10-13 PROCEDURE — 25000003 PHARM REV CODE 250: Performed by: STUDENT IN AN ORGANIZED HEALTH CARE EDUCATION/TRAINING PROGRAM

## 2024-10-13 PROCEDURE — S4991 NICOTINE PATCH NONLEGEND: HCPCS

## 2024-10-13 PROCEDURE — 85025 COMPLETE CBC W/AUTO DIFF WBC: CPT

## 2024-10-13 PROCEDURE — 63700000 PHARM REV CODE 250 ALT 637 W/O HCPCS

## 2024-10-13 RX ORDER — POTASSIUM CHLORIDE 20 MEQ/1
40 TABLET, EXTENDED RELEASE ORAL ONCE
Status: COMPLETED | OUTPATIENT
Start: 2024-10-13 | End: 2024-10-13

## 2024-10-13 RX ADMIN — GUAIFENESIN 600 MG: 600 TABLET, EXTENDED RELEASE ORAL at 08:10

## 2024-10-13 RX ADMIN — AZITHROMYCIN DIHYDRATE 500 MG: 250 TABLET ORAL at 09:10

## 2024-10-13 RX ADMIN — Medication 6 MG: at 08:10

## 2024-10-13 RX ADMIN — IPRATROPIUM BROMIDE AND ALBUTEROL SULFATE 3 ML: 2.5; .5 SOLUTION RESPIRATORY (INHALATION) at 09:10

## 2024-10-13 RX ADMIN — ENOXAPARIN SODIUM 40 MG: 40 INJECTION SUBCUTANEOUS at 06:10

## 2024-10-13 RX ADMIN — IPRATROPIUM BROMIDE AND ALBUTEROL SULFATE 3 ML: 2.5; .5 SOLUTION RESPIRATORY (INHALATION) at 11:10

## 2024-10-13 RX ADMIN — POLYETHYLENE GLYCOL 3350 17 G: 17 POWDER, FOR SOLUTION ORAL at 09:10

## 2024-10-13 RX ADMIN — VANCOMYCIN HYDROCHLORIDE 1250 MG: 1.25 INJECTION, POWDER, LYOPHILIZED, FOR SOLUTION INTRAVENOUS at 05:10

## 2024-10-13 RX ADMIN — NICOTINE 1 PATCH: 14 PATCH, EXTENDED RELEASE TRANSDERMAL at 09:10

## 2024-10-13 RX ADMIN — CEFTRIAXONE 1 G: 1 INJECTION, POWDER, FOR SOLUTION INTRAMUSCULAR; INTRAVENOUS at 02:10

## 2024-10-13 RX ADMIN — IPRATROPIUM BROMIDE AND ALBUTEROL SULFATE 3 ML: 2.5; .5 SOLUTION RESPIRATORY (INHALATION) at 02:10

## 2024-10-13 RX ADMIN — POTASSIUM CHLORIDE 40 MEQ: 1500 TABLET, EXTENDED RELEASE ORAL at 09:10

## 2024-10-13 RX ADMIN — GUAIFENESIN 600 MG: 600 TABLET, EXTENDED RELEASE ORAL at 09:10

## 2024-10-13 NOTE — PROGRESS NOTES
VANCOMYCIN DOSING BY PHARMACY DISCONTINUATION NOTE    Emilio Gallagher is a 29 y.o. male who had been consulted for vancomycin dosing.    The pharmacy consult for vancomycin dosing has been discontinued.     Vancomycin Dosing by Pharmacy Consult will sign-off. Please reconsult if necessary. Thank you for allowing us to participate in this patient's care.     Donaldo Mclaughlin, TheaD

## 2024-10-13 NOTE — PLAN OF CARE
Pt rested after receiving sleep aid. Fever are still coming and going. Pt continues to need oxygen.      Problem: Adult Inpatient Plan of Care  Goal: Plan of Care Review  Outcome: Progressing  Goal: Patient-Specific Goal (Individualized)  Outcome: Progressing  Goal: Absence of Hospital-Acquired Illness or Injury  Outcome: Progressing  Goal: Optimal Comfort and Wellbeing  Outcome: Progressing  Goal: Readiness for Transition of Care  Outcome: Progressing     Problem: Sepsis/Septic Shock  Goal: Optimal Coping  Outcome: Progressing  Goal: Absence of Bleeding  Outcome: Progressing  Goal: Blood Glucose Level Within Targeted Range  Outcome: Progressing  Goal: Absence of Infection Signs and Symptoms  Outcome: Progressing  Goal: Optimal Nutrition Intake  Outcome: Progressing

## 2024-10-13 NOTE — NURSING
Home Oxygen Evaluation    Date Performed: 10/13/2024    1) Patient's Home O2 Sat on room air, while at rest: 92        If O2 sats on room air at rest are 88% or below, patient qualifies. No additional testing needed. Document N/A in steps 2 and 3. If 89% or above, complete steps 2.      2) Patient's O2 Sat on room air while exercising: ***        If O2 sats on room air while exercising remain 89% or above patient does not qualify, no further testing needed Document N/A in step 3. If O2 sats on room air while exercising are 88% or below, continue to step 3.      3) Patient's O2 Sat while exercising on O2: *** at *** LPM         (Must show improvement from #2 for patients to qualify)    If O2 sats improve on oxygen, patient qualifies for portable oxygen. If not, the patient does not qualify.

## 2024-10-13 NOTE — ASSESSMENT & PLAN NOTE
Patient with Hypoxic Respiratory failure which is Acute.  he is not on home oxygen. Supplemental oxygen was provided and noted-      .   Signs/symptoms of respiratory failure include- tachypnea, increased work of breathing, and SOB . Contributing diagnoses includes -  sepsis  Labs and images were reviewed. Patient Has not had a recent ABG. Will treat underlying causes and adjust management of respiratory failure as follows    Plan:   - pulse ox of 91% on admission  - patient requiring supplemental O2   - currently on 0.5L O2 NC with sats of 93-95%  - scheduled duonebs q6  - incentive spirometer use  - chest physiotherapy prn  - continue to monitor  - goal is to wean off of O2  - 6 minute walk test before discharge

## 2024-10-13 NOTE — PLAN OF CARE
Problem: Sepsis/Septic Shock  Goal: Optimal Coping  Outcome: Progressing  Goal: Absence of Bleeding  Outcome: Progressing  Goal: Blood Glucose Level Within Targeted Range  Outcome: Progressing  Goal: Absence of Infection Signs and Symptoms  Outcome: Progressing  Goal: Optimal Nutrition Intake  Outcome: Progressing     Problem: Adult Inpatient Plan of Care  Goal: Plan of Care Review  Outcome: Progressing  Goal: Patient-Specific Goal (Individualized)  Outcome: Progressing  Goal: Absence of Hospital-Acquired Illness or Injury  Outcome: Progressing  Goal: Optimal Comfort and Wellbeing  Outcome: Progressing  Goal: Readiness for Transition of Care  Outcome: Progressing    Patient A & O x 4. VSS. 2L nc. IV abx.

## 2024-10-13 NOTE — ASSESSMENT & PLAN NOTE
Emilio Gallagher is a 29 y.o. male with hx of being a smoker presenting with SOB and fever. Patient has been fevering since Monday with a high of 105, experiencing SOB, and coughing with some green tinged sputum and a single episode of hemoptysis. Patient was given albuterol and Augmentin from the urgent care but has not gotten better.    Plan:   - BC x 2; no growth after 2 days  - CXR with no acute findings; repeat CXR WNL  - CT abdomen shows multifocal ground glass/consolidation concerning for multifocal infectious or inflammatory process  - continue Azithromycin 500mg x 3 days/ day 2/3  - continue Ceftriaxone 1g x 5 days; day 2/5  - MRSA of the nares negative; vancomycin discontinued

## 2024-10-13 NOTE — SUBJECTIVE & OBJECTIVE
Interval History: NAEON. AAO x3. Patient is just waking up. Family at bedside. Still has some complaints of SOB, but is satting well (93-95%) on 0-0.5L of O2 NC. Denies any HA, chest pain or pressure, hemoptysis, abdominal pain, or weakness.      Review of Systems   Constitutional:  Positive for appetite change and fatigue. Negative for chills, diaphoresis and fever.   HENT: Negative.  Negative for congestion, ear pain, hearing loss, rhinorrhea and sore throat.    Eyes: Negative.    Respiratory:  Negative for cough, chest tightness and shortness of breath.    Cardiovascular: Negative.  Negative for chest pain and palpitations.   Gastrointestinal:  Negative for abdominal distention, abdominal pain, blood in stool, constipation, diarrhea, nausea and vomiting.   Genitourinary: Negative.  Negative for dysuria, frequency and hematuria.   Musculoskeletal: Negative.  Negative for back pain, joint swelling and neck pain.   Skin: Negative.    Neurological: Negative.  Negative for dizziness, seizures, syncope, weakness, light-headedness and headaches.   Psychiatric/Behavioral: Negative.       Objective:     Vital Signs (Most Recent):  Temp: 98.8 °F (37.1 °C) (10/13/24 0750)  Pulse: 84 (10/13/24 0930)  Resp: (!) 21 (10/13/24 0930)  BP: 106/64 (10/13/24 0750)  SpO2: (!) 94 % (10/13/24 1100) Vital Signs (24h Range):  Temp:  [98.6 °F (37 °C)-100.8 °F (38.2 °C)] 98.8 °F (37.1 °C)  Pulse:  [] 84  Resp:  [17-21] 21  SpO2:  [93 %-95 %] 94 %  BP: (106-122)/(59-72) 106/64     Weight: 90.7 kg (200 lb)  Body mass index is 29.53 kg/m².    Intake/Output Summary (Last 24 hours) at 10/13/2024 1147  Last data filed at 10/12/2024 2034  Gross per 24 hour   Intake 1084.39 ml   Output 500 ml   Net 584.39 ml         Physical Exam  Constitutional:       General: He is not in acute distress.     Appearance: Normal appearance. He is ill-appearing.   HENT:      Head: Normocephalic.      Nose: Nose normal.   Eyes:      Extraocular Movements:  Extraocular movements intact.   Cardiovascular:      Rate and Rhythm: Normal rate and regular rhythm.      Pulses: Normal pulses.      Heart sounds: Normal heart sounds. No murmur heard.     No friction rub. No gallop.   Pulmonary:      Effort: Pulmonary effort is normal. No respiratory distress.      Breath sounds: Examination of the left-upper field reveals rhonchi. Examination of the left-middle field reveals rhonchi. Rhonchi present. No wheezing or rales.   Abdominal:      General: Bowel sounds are normal. There is no distension.      Palpations: Abdomen is soft.      Tenderness: There is no abdominal tenderness. There is no guarding.   Musculoskeletal:         General: No swelling. Normal range of motion.      Cervical back: Normal range of motion. No rigidity or tenderness.   Skin:     General: Skin is warm.   Neurological:      General: No focal deficit present.      Mental Status: He is alert and oriented to person, place, and time.   Psychiatric:         Mood and Affect: Mood normal.         Behavior: Behavior normal.             Significant Labs: All pertinent labs within the past 24 hours have been reviewed.  CBC:   Recent Labs   Lab 10/11/24  1330 10/12/24  0443 10/13/24  0402   WBC 8.90 7.87 6.59   HGB 13.7* 11.3* 11.4*   HCT 43.8 36.0* 36.3*    214 245     CMP:   Recent Labs   Lab 10/11/24  1330 10/12/24  0443 10/13/24  0402   * 137 141   K 3.7 3.3* 3.7    103 105   CO2 23 25 24    110 108   BUN 12 7 8   CREATININE 1.2 1.0 1.0   CALCIUM 9.7 8.8 9.0   PROT 7.7 6.3 6.5   ALBUMIN 4.2 3.4* 3.5   BILITOT 0.9 0.6 0.6   ALKPHOS 45* 36* 35*   AST 23 22 33   ALT 32 29 49*   ANIONGAP 10 9 12       Significant Imaging: I have reviewed all pertinent imaging results/findings within the past 24 hours.

## 2024-10-13 NOTE — ASSESSMENT & PLAN NOTE
Anemia is likely due to Iron deficiency. Most recent hemoglobin and hematocrit are listed below.  Recent Labs     10/11/24  1330 10/12/24  0443 10/13/24  0402   HGB 13.7* 11.3* 11.4*   HCT 43.8 36.0* 36.3*     Plan  - Monitor serial CBC: Daily  - Transfuse PRBC if patient becomes hemodynamically unstable, symptomatic or H/H drops below 7/21.  - Patient has not received any PRBC transfusions to date  - Patient's anemia is currently stable  - Patient has MCV of 61  - family history of thalassemia  - decreased iron, saturated iron, and TIBC with increased ferritin of 1254  - consider sending home with iron supplements  - f/u with PCP for repeat iron studies

## 2024-10-13 NOTE — PROGRESS NOTES
[facility] ([place] [id number])  [facility] Medicine  Progress Note    Patient Name: Emilio Gallagher  MRN: [id number]  Patient Class: IP- Inpatient   Admission Date: 10/11/2024  Length of Stay: 2 days  Attending Physician: Mary Alice Lopez MD  Primary Care Provider: Una, Primary Doctor        Subjective:     Principal Problem:Sepsis        HPI:  Emilio Gallagher is a 29 y.o. male with no significant PMH besides being a smoker, and family hx of MI presenting to the ED for SOB and fever. Per patient, fever has reached a Tmax of 105. He reports going mudriding on Saturday and feeling well until that evening when he noticed his taste was altered and has been since that evening. He had isolated vomiting episodes on 10/7 and 10/8 with emesis being lime green in color. Had 1 episode of small hemoptysis. Patient reports fevers minimally decreased with tylenol and ibuprofen to a low of 101. He visited an urgent care on 10/9 and was diagnosed with possible pneumonia and given an albuterol inhaler for SOB and Augmentin. On the same day, he went to outside ER and was diagnosed with left lower lobe pneumonia and was told to continue Augmentin. Patient is a current smoker with 7 pack years. He denies any recent travel, recent sick contacts, or illicit drug use. Does have a pet dog at home. Patient does have c/o chest pressure and SOB requiring 3L O2.     ED course: BP: 124/58, HR: 128, RR: 28, T: 103.8 O2: 91%. CBC and CMP completed. Blood cultures x2 collected. Troponin and BNP negative. HIV 1/2 Ab are nonreactive. Hep C Ab nonreactive. No detection of Covid, Influenza, or RSV. Lactate of 1.51. CXR shows clear lungs and no pleural effusion. CT abdomen/pelvis reading in process. Patient given fluids and started on vancomycin, azithromycin, and ceftriaxone. UA is clean.    Overview/Hospital Course:  Patient admitted for SOB and fever. Per patient, fever radha to 105. Patient received LR bolus in the ED. CXR looked clear.  CT scan showed ground glass consolidation. Started on vancomycin, azithromycin, and ceftriaxone. Troponin negative. D-Dimer slightly elevated. Doppler negative for DVT. Requiring oxygen at 3-4L per nasal cannula. Duonebs started. Incentive spirometer encouraged. BC with no growth after 48hrs, MRSA screen negative. Vancomycin discontinued.     Interval History: NAEON. AAO x3. Patient is just waking up. Family at bedside. Still has some complaints of SOB, but is satting well (93-95%) on 0-0.5L of O2 NC. Denies any HA, chest pain or pressure, hemoptysis, abdominal pain, or weakness.      Review of Systems   Constitutional:  Positive for appetite change and fatigue. Negative for chills, diaphoresis and fever.   HENT: Negative.  Negative for congestion, ear pain, hearing loss, rhinorrhea and sore throat.    Eyes: Negative.    Respiratory:  Negative for cough, chest tightness and shortness of breath.    Cardiovascular: Negative.  Negative for chest pain and palpitations.   Gastrointestinal:  Negative for abdominal distention, abdominal pain, blood in stool, constipation, diarrhea, nausea and vomiting.   Genitourinary: Negative.  Negative for dysuria, frequency and hematuria.   Musculoskeletal: Negative.  Negative for back pain, joint swelling and neck pain.   Skin: Negative.    Neurological: Negative.  Negative for dizziness, seizures, syncope, weakness, light-headedness and headaches.   Psychiatric/Behavioral: Negative.       Objective:     Vital Signs (Most Recent):  Temp: 98.8 °F (37.1 °C) (10/13/24 0750)  Pulse: 84 (10/13/24 0930)  Resp: (!) 21 (10/13/24 0930)  BP: 106/64 (10/13/24 0750)  SpO2: (!) 94 % (10/13/24 1100) Vital Signs (24h Range):  Temp:  [98.6 °F (37 °C)-100.8 °F (38.2 °C)] 98.8 °F (37.1 °C)  Pulse:  [] 84  Resp:  [17-21] 21  SpO2:  [93 %-95 %] 94 %  BP: (106-122)/(59-72) 106/64     Weight: 90.7 kg (200 lb)  Body mass index is 29.53 kg/m².    Intake/Output Summary (Last 24 hours) at 10/13/2024  1147  Last data filed at 10/12/2024 2034  Gross per 24 hour   Intake 1084.39 ml   Output 500 ml   Net 584.39 ml         Physical Exam  Constitutional:       General: He is not in acute distress.     Appearance: Normal appearance. He is ill-appearing.   HENT:      Head: Normocephalic.      Nose: Nose normal.   Eyes:      Extraocular Movements: Extraocular movements intact.   Cardiovascular:      Rate and Rhythm: Normal rate and regular rhythm.      Pulses: Normal pulses.      Heart sounds: Normal heart sounds. No murmur heard.     No friction rub. No gallop.   Pulmonary:      Effort: Pulmonary effort is normal. No respiratory distress.      Breath sounds: Examination of the left-upper field reveals rhonchi. Examination of the left-middle field reveals rhonchi. Rhonchi present. No wheezing or rales.   Abdominal:      General: Bowel sounds are normal. There is no distension.      Palpations: Abdomen is soft.      Tenderness: There is no abdominal tenderness. There is no guarding.   Musculoskeletal:         General: No swelling. Normal range of motion.      Cervical back: Normal range of motion. No rigidity or tenderness.   Skin:     General: Skin is warm.   Neurological:      General: No focal deficit present.      Mental Status: He is alert and oriented to person, place, and time.   Psychiatric:         Mood and Affect: Mood normal.         Behavior: Behavior normal.             Significant Labs: All pertinent labs within the past 24 hours have been reviewed.  CBC:   Recent Labs   Lab 10/11/24  1330 10/12/24  0443 10/13/24  0402   WBC 8.90 7.87 6.59   HGB 13.7* 11.3* 11.4*   HCT 43.8 36.0* 36.3*    214 245     CMP:   Recent Labs   Lab 10/11/24  1330 10/12/24  0443 10/13/24  0402   * 137 141   K 3.7 3.3* 3.7    103 105   CO2 23 25 24    110 108   BUN 12 7 8   CREATININE 1.2 1.0 1.0   CALCIUM 9.7 8.8 9.0   PROT 7.7 6.3 6.5   ALBUMIN 4.2 3.4* 3.5   BILITOT 0.9 0.6 0.6   ALKPHOS 45* 36* 35*   AST  23 22 33   ALT 32 29 49*   ANIONGAP 10 9 12       Significant Imaging: I have reviewed all pertinent imaging results/findings within the past 24 hours.    Assessment/Plan:      * Sepsis  Emilio Gallagher is a 29 y.o. male with hx of being a smoker presenting with SOB and fever. Patient has been fevering since Monday with a high of 105, experiencing SOB, and coughing with some green tinged sputum and a single episode of hemoptysis. Patient was given albuterol and Augmentin from the urgent care but has not gotten better.    Plan:   - BC x 2; no growth after 2 days  - CXR with no acute findings; repeat CXR WNL  - CT abdomen shows multifocal ground glass/consolidation concerning for multifocal infectious or inflammatory process  - continue Azithromycin 500mg x 3 days/ day 2/3  - continue Ceftriaxone 1g x 5 days; day 2/5  - MRSA of the nares negative; vancomycin discontinued          Microcytic anemia  Anemia is likely due to Iron deficiency. Most recent hemoglobin and hematocrit are listed below.  Recent Labs     10/11/24  1330 10/12/24  0443 10/13/24  0402   HGB 13.7* 11.3* 11.4*   HCT 43.8 36.0* 36.3*     Plan  - Monitor serial CBC: Daily  - Transfuse PRBC if patient becomes hemodynamically unstable, symptomatic or H/H drops below 7/21.  - Patient has not received any PRBC transfusions to date  - Patient's anemia is currently stable  - Patient has MCV of 61  - family history of thalassemia  - decreased iron, saturated iron, and TIBC with increased ferritin of 1254  - consider sending home with iron supplements  - f/u with PCP for repeat iron studies     Smoking history  Plan:   - encourage smoking cessation  - nicotine patch daily      Heartburn  Plan:   - patient has hx of heartburn  - takes approximately 6 tums daily  - give antacids prn      Acute hypoxic respiratory failure  Patient with Hypoxic Respiratory failure which is Acute.  he is not on home oxygen. Supplemental oxygen was provided and noted-      .    Signs/symptoms of respiratory failure include- tachypnea, increased work of breathing, and SOB . Contributing diagnoses includes -  sepsis  Labs and images were reviewed. Patient Has not had a recent ABG. Will treat underlying causes and adjust management of respiratory failure as follows    Plan:   - pulse ox of 91% on admission  - patient requiring supplemental O2   - currently on 0.5L O2 NC with sats of 93-95%  - scheduled duonebs q6  - incentive spirometer use  - chest physiotherapy prn  - continue to monitor  - goal is to wean off of O2  - 6 minute walk test before discharge    Constipation  Plan:  - start miralax daily        VTE Risk Mitigation (From admission, onward)           Ordered     enoxaparin injection 40 mg  Every 24 hours         10/11/24 2032     IP VTE LOW RISK PATIENT  Once         10/11/24 1555     Place sequential compression device  Until discontinued         10/11/24 1555                    Discharge Planning   ESTHER: 10/15/2024     Code Status: Full Code   Is the patient medically ready for discharge?:     Reason for patient still in hospital (select all that apply): Pending disposition                     Valentine Reynaga MD  PGY-1  Department of Hospital Medicine   [facility] ([place])

## 2024-10-13 NOTE — PROGRESS NOTES
Pharmacokinetic Assessment Follow Up: IV Vancomycin    Vancomycin serum concentration assessment(s):    The trough level was drawn correctly and can be used to guide therapy at this time. The measurement is below the desired definitive target range of 15 to 20 mcg/mL.    Vancomycin Regimen Plan:    Change regimen to Vancomycin 1250 mg IV every 8 hours with next serum trough concentration measured at 0530 on 10/14    Drug levels (last 3 results):  Recent Labs   Lab Result Units 10/13/24  0402   Vancomycin-Trough ug/mL 12.0       Pharmacy will continue to follow and monitor vancomycin.    Please contact pharmacy at extension [phone] for questions regarding this assessment.    Thank you for the consult,   Lynne Crawford       Patient brief summary:  Emilio Gallagher is a 29 y.o. male initiated on antimicrobial therapy with IV Vancomycin for treatment of sepsis/LRTI    Drug Allergies:   Review of patient's allergies indicates:  No Known Allergies    Actual Body Weight:   90.7 kg    Renal Function:   Estimated Creatinine Clearance: 122.4 mL/min (based on SCr of 1 mg/dL).    Dialysis Method (if applicable):  N/A    CBC (last 72 hours):  Recent Labs   Lab Result Units 10/11/24  1330 10/11/24  1657 10/12/24  0443 10/13/24  0402   WBC K/uL 8.90  --  7.87 6.59   Hemoglobin g/dL 13.7*  --  11.3* 11.4*   Hemoglobin A1C %  --  5.1  --   --    Hematocrit % 43.8  --  36.0* 36.3*   Platelets K/uL 232  --  214 245   Gran % % 80.6*  --  71.2 66.3   Lymph % % 10.2*  --  18.3 22.0   Mono % % 8.3  --  9.4 9.6   Eosinophil % % 0.0  --  0.0 0.5   Basophil % % 0.6  --  0.8 1.1   Differential Method  Automated  --  Automated Automated       Metabolic Panel (last 72 hours):  Recent Labs   Lab Result Units 10/11/24  1330 10/11/24  1659 10/12/24  0443 10/13/24  0402   Sodium mmol/L 134*  --  137 141   Potassium mmol/L 3.7  --  3.3* 3.7   Chloride mmol/L 101  --  103 105   CO2 mmol/L 23  --  25 24   Glucose mg/dL 109  --  110 108   Glucose, UA    --  Negative  --   --    BUN mg/dL 12  --  7 8   Creatinine mg/dL 1.2  --  1.0 1.0   Albumin g/dL 4.2  --  3.4* 3.5   Total Bilirubin mg/dL 0.9  --  0.6 0.6   Alkaline Phosphatase U/L 45*  --  36* 35*   AST U/L 23  --  22 33   ALT U/L 32  --  29 49*   Magnesium mg/dL 2.1  --  2.0 2.2   Phosphorus mg/dL 2.1*  --   --   --        Vancomycin Administrations:  vancomycin given in the last 96 hours                     vancomycin 1.75 g in 5 % dextrose 500 mL IVPB (mg) 1,750 mg New Bag 10/12/24 1802     1,750 mg New Bag  0513    vancomycin 1.75 g in 5 % dextrose 500 mL IVPB (mg) 1,750 mg New Bag 10/11/24 1659                    Microbiologic Results:  Microbiology Results (last 7 days)       Procedure Component Value Units Date/Time    MRSA Screen by PCR [[id number]] Collected: 10/12/24 1402    Order Status: Completed Specimen: Nasal Swab Updated: 10/12/24 1801     MRSA SCREEN BY PCR Not Detected    Blood culture x two cultures. Draw prior to antibiotics. [[id number]] Collected: 10/11/24 1330    Order Status: Completed Specimen: Blood from Peripheral, Antecubital, Right Updated: 10/12/24 1612     Blood Culture, Routine No Growth to date      No Growth to date    Narrative:      Aerobic and anaerobic    Blood culture x two cultures. Draw prior to antibiotics. [[id number]] Collected: 10/11/24 1335    Order Status: Completed Specimen: Blood from Peripheral, Antecubital, Left Updated: 10/12/24 1612     Blood Culture, Routine No Growth to date      No Growth to date    Narrative:      Aerobic and anaerobic

## 2024-10-14 ENCOUNTER — PATIENT MESSAGE (OUTPATIENT)
Dept: ORTHOPEDICS | Facility: CLINIC | Age: 29
End: 2024-10-14
Payer: COMMERCIAL

## 2024-10-14 VITALS
WEIGHT: 200 LBS | OXYGEN SATURATION: 96 % | HEIGHT: 69 IN | BODY MASS INDEX: 29.62 KG/M2 | TEMPERATURE: 99 F | DIASTOLIC BLOOD PRESSURE: 78 MMHG | HEART RATE: 70 BPM | RESPIRATION RATE: 17 BRPM | SYSTOLIC BLOOD PRESSURE: 119 MMHG

## 2024-10-14 PROBLEM — Z09 HOSPITAL DISCHARGE FOLLOW-UP: Status: ACTIVE | Noted: 2024-10-14

## 2024-10-14 LAB
ALBUMIN SERPL BCP-MCNC: 3.6 G/DL (ref 3.5–5.2)
ALP SERPL-CCNC: 37 U/L (ref 55–135)
ALT SERPL W/O P-5'-P-CCNC: 65 U/L (ref 10–44)
ANION GAP SERPL CALC-SCNC: 9 MMOL/L (ref 8–16)
AST SERPL-CCNC: 33 U/L (ref 10–40)
BASOPHILS # BLD AUTO: 0.07 K/UL (ref 0–0.2)
BASOPHILS NFR BLD: 1.2 % (ref 0–1.9)
BILIRUB SERPL-MCNC: 0.7 MG/DL (ref 0.1–1)
BUN SERPL-MCNC: 9 MG/DL (ref 6–20)
CALCIUM SERPL-MCNC: 9.4 MG/DL (ref 8.7–10.5)
CHLORIDE SERPL-SCNC: 105 MMOL/L (ref 95–110)
CO2 SERPL-SCNC: 22 MMOL/L (ref 23–29)
CREAT SERPL-MCNC: 0.9 MG/DL (ref 0.5–1.4)
DIFFERENTIAL METHOD BLD: ABNORMAL
EOSINOPHIL # BLD AUTO: 0 K/UL (ref 0–0.5)
EOSINOPHIL NFR BLD: 0 % (ref 0–8)
ERYTHROCYTE [DISTWIDTH] IN BLOOD BY AUTOMATED COUNT: 17 % (ref 11.5–14.5)
EST. GFR  (NO RACE VARIABLE): >60 ML/MIN/1.73 M^2
GLUCOSE SERPL-MCNC: 81 MG/DL (ref 70–110)
HCT VFR BLD AUTO: 37.5 % (ref 40–54)
HGB BLD-MCNC: 11.6 G/DL (ref 14–18)
IMM GRANULOCYTES # BLD AUTO: 0.01 K/UL (ref 0–0.04)
IMM GRANULOCYTES NFR BLD AUTO: 0.2 % (ref 0–0.5)
LEPTOSPIRA AB SER QL: NEGATIVE
LYMPHOCYTES # BLD AUTO: 1.4 K/UL (ref 1–4.8)
LYMPHOCYTES NFR BLD: 23.8 % (ref 18–48)
MAGNESIUM SERPL-MCNC: 2.3 MG/DL (ref 1.6–2.6)
MCH RBC QN AUTO: 19.3 PG (ref 27–31)
MCHC RBC AUTO-ENTMCNC: 30.9 G/DL (ref 32–36)
MCV RBC AUTO: 62 FL (ref 82–98)
MONOCYTES # BLD AUTO: 0.4 K/UL (ref 0.3–1)
MONOCYTES NFR BLD: 6.9 % (ref 4–15)
NEUTROPHILS # BLD AUTO: 3.9 K/UL (ref 1.8–7.7)
NEUTROPHILS NFR BLD: 67.9 % (ref 38–73)
NRBC BLD-RTO: 0 /100 WBC
PLATELET # BLD AUTO: 284 K/UL (ref 150–450)
PMV BLD AUTO: 10.1 FL (ref 9.2–12.9)
POTASSIUM SERPL-SCNC: 4.4 MMOL/L (ref 3.5–5.1)
PROT SERPL-MCNC: 6.7 G/DL (ref 6–8.4)
RBC # BLD AUTO: 6.01 M/UL (ref 4.6–6.2)
SODIUM SERPL-SCNC: 136 MMOL/L (ref 136–145)
WBC # BLD AUTO: 5.76 K/UL (ref 3.9–12.7)

## 2024-10-14 PROCEDURE — 36415 COLL VENOUS BLD VENIPUNCTURE: CPT

## 2024-10-14 PROCEDURE — 94761 N-INVAS EAR/PLS OXIMETRY MLT: CPT

## 2024-10-14 PROCEDURE — 94667 MNPJ CHEST WALL 1ST: CPT

## 2024-10-14 PROCEDURE — 27000221 HC OXYGEN, UP TO 24 HOURS

## 2024-10-14 PROCEDURE — 63600175 PHARM REV CODE 636 W HCPCS

## 2024-10-14 PROCEDURE — 83735 ASSAY OF MAGNESIUM: CPT

## 2024-10-14 PROCEDURE — 94640 AIRWAY INHALATION TREATMENT: CPT

## 2024-10-14 PROCEDURE — 25000242 PHARM REV CODE 250 ALT 637 W/ HCPCS

## 2024-10-14 PROCEDURE — 63700000 PHARM REV CODE 250 ALT 637 W/O HCPCS

## 2024-10-14 PROCEDURE — 80053 COMPREHEN METABOLIC PANEL: CPT

## 2024-10-14 PROCEDURE — 25000003 PHARM REV CODE 250

## 2024-10-14 PROCEDURE — 94799 UNLISTED PULMONARY SVC/PX: CPT

## 2024-10-14 PROCEDURE — 85025 COMPLETE CBC W/AUTO DIFF WBC: CPT

## 2024-10-14 PROCEDURE — 99900035 HC TECH TIME PER 15 MIN (STAT)

## 2024-10-14 RX ORDER — IBUPROFEN 200 MG
1 TABLET ORAL DAILY
Qty: 28 PATCH | Refills: 0 | Status: SHIPPED | OUTPATIENT
Start: 2024-10-14

## 2024-10-14 RX ORDER — MONTELUKAST SODIUM 10 MG/1
10 TABLET ORAL DAILY
Qty: 30 TABLET | Refills: 0 | Status: SHIPPED | OUTPATIENT
Start: 2024-10-14 | End: 2024-10-22

## 2024-10-14 RX ORDER — ALBUTEROL SULFATE 90 UG/1
2 INHALANT RESPIRATORY (INHALATION) EVERY 6 HOURS PRN
Qty: 6.7 G | Refills: 1 | Status: SHIPPED | OUTPATIENT
Start: 2024-10-14 | End: 2025-10-14

## 2024-10-14 RX ORDER — BENZONATATE 200 MG/1
200 CAPSULE ORAL 3 TIMES DAILY PRN
Qty: 21 CAPSULE | Refills: 0 | Status: SHIPPED | OUTPATIENT
Start: 2024-10-14 | End: 2024-10-23

## 2024-10-14 RX ORDER — CEFPODOXIME PROXETIL 100 MG/1
200 TABLET, FILM COATED ORAL EVERY 12 HOURS
Qty: 2 TABLET | Refills: 0 | Status: SHIPPED | OUTPATIENT
Start: 2024-10-15 | End: 2024-10-14

## 2024-10-14 RX ORDER — GUAIFENESIN 600 MG/1
600 TABLET, EXTENDED RELEASE ORAL 2 TIMES DAILY
Qty: 14 TABLET | Refills: 0 | Status: SHIPPED | OUTPATIENT
Start: 2024-10-14 | End: 2024-10-23

## 2024-10-14 RX ORDER — CEFPODOXIME PROXETIL 200 MG/1
200 TABLET, FILM COATED ORAL EVERY 12 HOURS
Qty: 2 TABLET | Refills: 0 | Status: SHIPPED | OUTPATIENT
Start: 2024-10-15 | End: 2024-10-16

## 2024-10-14 RX ADMIN — GUAIFENESIN 600 MG: 600 TABLET, EXTENDED RELEASE ORAL at 09:10

## 2024-10-14 RX ADMIN — AZITHROMYCIN DIHYDRATE 500 MG: 250 TABLET ORAL at 09:10

## 2024-10-14 RX ADMIN — IPRATROPIUM BROMIDE AND ALBUTEROL SULFATE 3 ML: 2.5; .5 SOLUTION RESPIRATORY (INHALATION) at 08:10

## 2024-10-14 RX ADMIN — CEFTRIAXONE 1 G: 1 INJECTION, POWDER, FOR SOLUTION INTRAMUSCULAR; INTRAVENOUS at 01:10

## 2024-10-14 RX ADMIN — POLYETHYLENE GLYCOL 3350 17 G: 17 POWDER, FOR SOLUTION ORAL at 09:10

## 2024-10-14 NOTE — PLAN OF CARE
Pt is alert and oriented times 4, O2@2LPM when in bed, ambulating in millard. Melatonin used to help patient sleep. NAD noted.      Problem: Adult Inpatient Plan of Care  Goal: Plan of Care Review  Outcome: Progressing  Goal: Patient-Specific Goal (Individualized)  Outcome: Progressing  Goal: Absence of Hospital-Acquired Illness or Injury  Outcome: Progressing  Goal: Optimal Comfort and Wellbeing  Outcome: Progressing  Goal: Readiness for Transition of Care  Outcome: Progressing     Problem: Sepsis/Septic Shock  Goal: Optimal Coping  Outcome: Progressing  Goal: Absence of Bleeding  Outcome: Progressing  Goal: Blood Glucose Level Within Targeted Range  Outcome: Progressing  Goal: Absence of Infection Signs and Symptoms  Outcome: Progressing  Goal: Optimal Nutrition Intake  Outcome: Progressing

## 2024-10-14 NOTE — NURSING
Patient alert and oriented x 4. VSS. Room air. Patient discharge ordered. IV removed, Pharm delivered bedside. Discharge paperwork provided and reviewed with patient. Patient discharging home via spouse.

## 2024-10-14 NOTE — DISCHARGE SUMMARY
[facility] - Stepdown Flex ([place] [address])  [facility] Medicine  Discharge Summary      Patient Name: Emilio Gallagher  MRN: [id number]  [facility]: [id number]  Patient Class: IP- Inpatient  Admission Date: 10/11/2024  Hospital Length of Stay: 3 days  Discharge Date and Time:  10/14/2024 2:04 PM  Attending Physician: Mary Alice Lopez MD   Discharging Provider: Adry Fernandez MD  Primary Care Provider: Una Primary Doctor  [facility] Medicine Team: [facility]OS[facility] MED 4 Adry Fernandez MD  Primary Care Team: [facility] HOS[facility] MED 4    HPI:   Emilio Gallagher is a 29 y.o. male with no significant PMH besides being a smoker, and family hx of MI presenting to the ED for SOB and fever. Per patient, fever has reached a Tmax of 105. He reports going mudriding on Saturday and feeling well until that evening when he noticed his taste was altered and has been since that evening. He had isolated vomiting episodes on 10/7 and 10/8 with emesis being lime green in color. Had 1 episode of small hemoptysis. Patient reports fevers minimally decreased with tylenol and ibuprofen to a low of 101. He visited an urgent care on 10/9 and was diagnosed with possible pneumonia and given an albuterol inhaler for SOB and Augmentin. On the same day, he went to outside ER and was diagnosed with left lower lobe pneumonia and was told to continue Augmentin. Patient is a current smoker with 7 pack years. He denies any recent travel, recent sick contacts, or illicit drug use. Does have a pet dog at home. Patient does have c/o chest pressure and SOB requiring 3L O2.     ED course: BP: 124/58, HR: 128, RR: 28, T: 103.8 O2: 91%. CBC and CMP completed. Blood cultures x2 collected. Troponin and BNP negative. HIV 1/2 Ab are nonreactive. Hep C Ab nonreactive. No detection of Covid, Influenza, or RSV. Lactate of 1.51. CXR shows clear lungs and no pleural effusion. CT abdomen/pelvis reading in process. Patient given fluids and started on vancomycin, azithromycin, and ceftriaxone. UA is  clean.    * No surgery found *      Hospital Course:   Admitted for sepsis (3/4 SIRS criteria - tachycardia, tachypnea, febrile) secondary to pneumonia despite outpatient antibiotics. Patient had CXR done at urgent care on 10/9 which was read as LLL infiltrate. Repeat CXR on 10/11 was clear - in contrast to CT chest which showed bilateral patchy ground-glass opacities. Patient has new oxygen requirement (up to 3L) with complaint of 'feeling starved for oxygen'. COVID/RSV/flu negative. Treated with course of antibiotics for community acquired pneumonia with azithromycin and ceftriaxone/cefpodoxime; vanc dc'd as MRSA nares negative. CTA negative for PE in large arteries, d-dimer slightly elevated but BLE DVT ultrasound negative. Small low attenuating focus in the right hepatic dome seen on CT, RUQUS done showing hemangioma, no abscess. Continue supportive care - including chest physiotherapy and schedule duonebs. Oxygen requirement decreasing, continue weaning. Stable for discharge on 10/14 with PCP referral for close follow-up and smoking cessation counseling. Nicotine patches provided.      Goals of Care Treatment Preferences:  Code Status: Full Code    Physical Exam  Constitutional:       General: He is not in acute distress.     Appearance: Normal appearance.   HENT:      Head: Normocephalic.      Nose: Nose normal.   Eyes:      Extraocular Movements: Extraocular movements intact.   Cardiovascular:      Rate and Rhythm: Normal rate and regular rhythm.      Pulses: Normal pulses.      Heart sounds: Normal heart sounds. No murmur heard.     No friction rub. No gallop.   Pulmonary:      Effort: Pulmonary effort is normal. No respiratory distress.      Breath sounds: Examination of the left-upper field reveals rhonchi. Examination of the left-middle field reveals rhonchi. Rhonchi present. No wheezing or rales.   Abdominal:      General: Bowel sounds are normal. There is no distension.      Palpations: Abdomen is soft.       Tenderness: There is no abdominal tenderness. There is no guarding.   Musculoskeletal:         General: No swelling. Normal range of motion.      Cervical back: Normal range of motion. No rigidity or tenderness.   Skin:     General: Skin is warm.   Neurological:      General: No focal deficit present.      Mental Status: He is alert and oriented to person, place, and time.   Psychiatric:         Mood and Affect: Mood normal.         Behavior: Behavior normal.      Consults:     Oncology  Microcytic anemia  Anemia is likely due to Iron deficiency. Most recent hemoglobin and hematocrit are listed below.  Recent Labs     10/11/24  1330 10/12/24  0443 10/13/24  0402   HGB 13.7* 11.3* 11.4*   HCT 43.8 36.0* 36.3*     Plan  - Monitor serial CBC: Daily  - Transfuse PRBC if patient becomes hemodynamically unstable, symptomatic or H/H drops below 7/21.  - Patient has not received any PRBC transfusions to date  - Patient's anemia is currently stable  - Patient has MCV of 61  - family history of thalassemia  - decreased iron, saturated iron, and TIBC with increased ferritin of 1254  - consider sending home with iron supplements  - f/u with PCP for repeat iron studies       Final Active Diagnoses:    Diagnosis Date Noted POA    PRINCIPAL PROBLEM:  Sepsis [A41.9] 10/11/2024 Yes    Microcytic anemia [D50.9] 10/13/2024 Yes    Tobacco dependency [F17.200] 10/12/2024 Yes    Smoking history [Z87.891] 10/12/2024 Not Applicable    Constipation [K59.00] 10/11/2024 Yes    Acute hypoxic respiratory failure [J96.01] 10/11/2024 Yes    Heartburn [R12] 10/11/2024 Yes      Problems Resolved During this Admission:       Discharged Condition: stable    Disposition: Home or Self Care    Follow Up:    Patient Instructions:      Ambulatory referral/consult to Internal Medicine   Standing Status: Future   Referral Priority: Routine Referral Type: Consultation   Referral Reason: Specialty Services Required   Requested Specialty: Internal  Medicine   Number of Visits Requested: 1     Ambulatory referral/consult to Sleep Disorders   Standing Status: Future   Referral Priority: Routine Referral Type: Consultation   Requested Specialty: Sleep Medicine   Number of Visits Requested: 1     Ambulatory referral/consult to Smoking Cessation Program   Standing Status: Future   Referral Priority: Routine Referral Type: Consultation   Referral Reason: Specialty Services Required   Requested Specialty: CTTS   Number of Visits Requested: 1     Diet Adult Regular     Notify your health care provider if you experience any of the following:  temperature >100.4     Notify your health care provider if you experience any of the following:  persistent nausea and vomiting or diarrhea     Notify your health care provider if you experience any of the following:  increased confusion or weakness     Notify your health care provider if you experience any of the following:  persistent dizziness, light-headedness, or visual disturbances     Notify your health care provider if you experience any of the following:  difficulty breathing or increased cough     Activity as tolerated       Significant Diagnostic Studies: Labs: CMP   Recent Labs   Lab 10/13/24  0402 10/14/24  0802    136   K 3.7 4.4    105   CO2 24 22*    81   BUN 8 9   CREATININE 1.0 0.9   CALCIUM 9.0 9.4   PROT 6.5 6.7   ALBUMIN 3.5 3.6   BILITOT 0.6 0.7   ALKPHOS 35* 37*   AST 33 33   ALT 49* 65*   ANIONGAP 12 9   , CBC   Recent Labs   Lab 10/13/24  0402 10/14/24  0802   WBC 6.59 5.76   HGB 11.4* 11.6*   HCT 36.3* 37.5*    284   , and All labs within the past 24 hours have been reviewed  Microbiology: Blood Culture   Lab Results   Component Value Date    LABBLOO No Growth to date 10/11/2024    LABBLOO No Growth to date 10/11/2024    LABBLOO No Growth to date 10/11/2024       Pending Diagnostic Studies:       Procedure Component Value Units Date/Time    Leptospira antibody [[id number]]  Collected: 10/11/24 1455    Order Status: Sent Lab Status: In process Updated: 10/11/24 1459    Specimen: Blood     Urinalysis, Reflex to Urine Culture Urine, Clean Catch [[id number]] Collected: 10/11/24 1655    Order Status: Sent Lab Status: In process Updated: 10/11/24 1655    Specimen: Urine            Medications:  Reconciled Home Medications:      Medication List        START taking these medications      albuterol 90 mcg/actuation inhaler  Commonly known as: PROVENTIL HFA  Inhale 2 puffs into the lungs every 6 (six) hours as needed for Wheezing or Shortness of Breath. Rescue     benzonatate 200 MG capsule  Commonly known as: TESSALON  Take 1 capsule (200 mg total) by mouth 3 (three) times daily as needed for Cough.     cefpodoxime 200 MG tablet  Commonly known as: VANTIN  Take 1 tablet (200 mg total) by mouth every 12 (twelve) hours. for 1 day  Start taking on: October 15, 2024     guaiFENesin 600 mg 12 hr tablet  Commonly known as: MUCINEX  Take 1 tablet (600 mg total) by mouth 2 (two) times daily. for 7 days     montelukast 10 mg tablet  Commonly known as: SINGULAIR  Take 1 tablet (10 mg total) by mouth once daily.  Replaces: cetirizine 10 MG tablet     nicotine 14 mg/24 hr  Commonly known as: NICODERM CQ  Place 1 patch onto the skin once daily.            CONTINUE taking these medications      ibuprofen 800 MG tablet  Commonly known as: ADVIL,MOTRIN  Take 1 tablet (800 mg total) by mouth every 6 (six) hours as needed for Pain.            STOP taking these medications      amoxicillin-clavulanate 875-125mg 875-125 mg per tablet  Commonly known as: AUGMENTIN     cetirizine 10 MG tablet  Commonly known as: ZYRTEC  Replaced by: montelukast 10 mg tablet     clobetasoL 0.05 % cream  Commonly known as: TEMOVATE              Indwelling Lines/Drains at time of discharge:   Lines/Drains/Airways       None                   Time spent on the discharge of patient: 35 minutes         Adry Fernandez MD  Department of  [facility] Medicine  [facility] - Stepdown Flex ([place]-14)

## 2024-10-14 NOTE — NURSING
Home Oxygen Evaluation    Date Performed: 10/14/2024    1) Patient's Home O2 Sat on room air, while at rest: 97        If O2 sats on room air at rest are 88% or below, patient qualifies. No additional testing needed. Document N/A in steps 2 and 3. If 89% or above, complete steps 2.      2) Patient's O2 Sat on room air while exercisin        If O2 sats on room air while exercising remain 89% or above patient does not qualify, no further testing needed Document N/A in step 3. If O2 sats on room air while exercising are 88% or below, continue to step 3.      3) Patient's O2 Sat while exercising on O2: n/a at n/a LPM         (Must show improvement from #2 for patients to qualify)    If O2 sats improve on oxygen, patient qualifies for portable oxygen. If not, the patient does not qualify.

## 2024-10-14 NOTE — PLAN OF CARE
[facility] - [facility] ([place]  Initial Discharge Assessment       Primary Care Provider: No, Primary Doctor    Admission Diagnosis: Tachycardia [R00.0]  Chest pain [R07.9]  Fever, unspecified fever cause [R50.9]  Sepsis, due to unspecified organism, unspecified whether acute organ dysfunction present [A41.9]    Admission Date: 10/11/2024  Expected Discharge Date: 10/14/2024    Transition of Care Barriers: None    Payor: BLUE CROSS BLUE SHIELD / Plan: [place] HMO / Product Type: HMO /     Extended Emergency Contact Information  Primary Emergency Contact: Contact,No   [place]  Home Phone: [phone]  Relation: None    Discharge Plan A: Home with family  Discharge Plan B: Home      [facility] [employer] [id number] [place] [address] [address] [facility] [address]  [address]  [facility] [address]  Phone: [phone] Fax: [phone]      Initial Assessment (most recent)       Adult Discharge Assessment - 10/14/24 1355          Discharge Assessment    Assessment Type Discharge Planning Assessment     Confirmed/corrected address, phone number and insurance Yes     Confirmed Demographics Correct on Facesheet     Source of Information patient     Does patient/caregiver understand observation status Yes     Communicated ESTHER with patient/caregiver Yes     Reason For Admission Tachycardia     People in Home significant other     Do you expect to return to your current living situation? No     Prior to hospitilization cognitive status: Alert/Oriented     Current cognitive status: Alert/Oriented     Walking or Climbing Stairs Difficulty no     Dressing/Bathing Difficulty no     Equipment Currently Used at Home none     Readmission within 30 days? No     Patient currently being followed by outpatient case management? No     Do you take prescription medications? Yes     Do you have prescription coverage? Yes     Coverage BCBS     Do you have any problems affording  any of your prescribed medications? No     Is the patient taking medications as prescribed? yes     Who is going to help you get home at discharge? significant other     How do you get to doctors appointments? family or friend will provide     Are you on dialysis? No     Do you take coumadin? No     Discharge Plan A Home with family     Discharge Plan B Home     DME Needed Upon Discharge  none     Discharge Plan discussed with: Patient     Transition of Care Barriers None        Physical Activity    On average, how many days per week do you engage in moderate to strenuous exercise (like a brisk walk)? Patient declined     On average, how many minutes do you engage in exercise at this level? Patient declined        Financial Resource Strain    How hard is it for you to pay for the very basics like food, housing, medical care, and heating? Not very hard        Housing Stability    In the last 12 months, was there a time when you were not able to pay the mortgage or rent on time? No     At any time in the past 12 months, were you homeless or living in a shelter (including now)? No        Transportation Needs    Has the lack of transportation kept you from medical appointments, meetings, work or from getting things needed for daily living? Patient declined        Food Insecurity    Within the past 12 months, you worried that your food would run out before you got the money to buy more. Never true     Within the past 12 months, the food you bought just didn't last and you didn't have money to get more. Never true        Stress    Do you feel stress - tense, restless, nervous, or anxious, or unable to sleep at night because your mind is troubled all the time - these days? Not at all        Social Isolation    How often do you feel lonely or isolated from those around you?  Never        Alcohol Use    Q1: How often do you have a drink containing alcohol? Patient declined     Q2: How many drinks containing alcohol do you  have on a typical day when you are drinking? Patient declined     Q3: How often do you have six or more drinks on one occasion? Patient declined        Utilities    In the past 12 months has the electric, gas, oil, or water company threatened to shut off services in your home? Patient declined                      SW met with pt to discuss discharge planning.  Pt lives with significant other and doesn't need assistance with ambulation and ADLs.  No dialysis.  Pt will have transportation and assistance from significant other at discharge.  Discharge Plan A and Plan B have been determined by review of patient's clinical status, future medical and therapeutic needs, and coverage/benefits for post-acute care in coordination with multidisciplinary team members.      Lucía Lopez MSW, CSW

## 2024-10-15 ENCOUNTER — PATIENT OUTREACH (OUTPATIENT)
Dept: ADMINISTRATIVE | Facility: CLINIC | Age: 29
End: 2024-10-15
Payer: COMMERCIAL

## 2024-10-15 NOTE — PROGRESS NOTES
2nd attempt to make TCC Call. Left voicemail. Please call [phone] leave your first and last name and date of birth and ask for Zuri. I will return your call as soon as possible.

## 2024-10-15 NOTE — PLAN OF CARE
[facility] - Stepdown Flex ([place]-14)  Discharge Final Note    Primary Care Provider: Una, Primary Doctor    Expected Discharge Date: 10/14/2024    Patient discharged to home via personal transportation.     Patient's bedside nurse and pt notified of the above.    Discharge Plan A and Plan B have been determined by review of patient's clinical status, future medical and therapeutic needs, and coverage/benefits for post-acute care in coordination with multidisciplinary team members.        Final Discharge Note (most recent)       Final Note - 10/15/24 0904          Final Note    Assessment Type Final Discharge Note     Anticipated Discharge Disposition Home or Self Care     What phone number can be called within the next 1-3 days to see how you are doing after discharge? [phone]     Hospital Resources/Appts/Education Provided Appointments scheduled and added to AVS        Post-Acute Status    Post-Acute Authorization Other     Coverage BCBS     Other Status No Post-Acute Service Needs     Discharge Delays None known at this time                     Important Message from Medicare                 Future Appointments   Date Time Provider Department Center   10/22/2024 10:00 AM Jason Zepeda DO [facility] IM [place]   10/24/2024 10:00 AM Effie Wilhelm, CTTS [facility] [place]       CHW scheduled post-discharge follow-up appointment and information added to AVS.     Lucía Lopez MSW, CSW

## 2024-10-15 NOTE — PROGRESS NOTES
C3 nurse attempted to contact Emilio Gallagher for a TCC post hospital discharge follow up call. No answer. Left voicemail with callback information. The patient has a scheduled HOSFU appointment with Jason Zepeda on 10/22/24 @ 1000.

## 2024-10-16 LAB
BACTERIA BLD CULT: NORMAL
BACTERIA BLD CULT: NORMAL

## 2024-10-16 NOTE — PROGRESS NOTES
3rd attempt for TCC Call; Left voicemail. Please call [phone] leave first and last name and  and ask for Zuri. I will return your call as soon as possible.       3rd attempt to contact patient. Manually enrolled in Post Discharge Tracker.

## 2024-10-22 ENCOUNTER — OFFICE VISIT (OUTPATIENT)
Dept: INTERNAL MEDICINE | Facility: CLINIC | Age: 29
End: 2024-10-22
Payer: COMMERCIAL

## 2024-10-22 ENCOUNTER — TELEPHONE (OUTPATIENT)
Dept: INTERNAL MEDICINE | Facility: CLINIC | Age: 29
End: 2024-10-22

## 2024-10-22 VITALS
WEIGHT: 192.81 LBS | OXYGEN SATURATION: 96 % | SYSTOLIC BLOOD PRESSURE: 98 MMHG | DIASTOLIC BLOOD PRESSURE: 64 MMHG | HEART RATE: 95 BPM | TEMPERATURE: 98 F | HEIGHT: 69 IN | BODY MASS INDEX: 28.56 KG/M2 | RESPIRATION RATE: 16 BRPM

## 2024-10-22 DIAGNOSIS — Z09 HOSPITAL DISCHARGE FOLLOW-UP: ICD-10-CM

## 2024-10-22 DIAGNOSIS — Z00.00 ANNUAL PHYSICAL EXAM: Primary | ICD-10-CM

## 2024-10-22 DIAGNOSIS — J18.9 PNEUMONIA OF LEFT LOWER LOBE DUE TO INFECTIOUS ORGANISM: ICD-10-CM

## 2024-10-22 PROCEDURE — 99215 OFFICE O/P EST HI 40 MIN: CPT | Mod: S$GLB,,, | Performed by: INTERNAL MEDICINE

## 2024-10-22 PROCEDURE — 3008F BODY MASS INDEX DOCD: CPT | Mod: CPTII,S$GLB,, | Performed by: INTERNAL MEDICINE

## 2024-10-22 PROCEDURE — 3078F DIAST BP <80 MM HG: CPT | Mod: CPTII,S$GLB,, | Performed by: INTERNAL MEDICINE

## 2024-10-22 PROCEDURE — 99999 PR PBB SHADOW E&M-EST. PATIENT-LVL IV: CPT | Mod: PBBFAC,,, | Performed by: INTERNAL MEDICINE

## 2024-10-22 PROCEDURE — 1111F DSCHRG MED/CURRENT MED MERGE: CPT | Mod: CPTII,S$GLB,, | Performed by: INTERNAL MEDICINE

## 2024-10-22 PROCEDURE — 3044F HG A1C LEVEL LT 7.0%: CPT | Mod: CPTII,S$GLB,, | Performed by: INTERNAL MEDICINE

## 2024-10-22 PROCEDURE — 1159F MED LIST DOCD IN RCRD: CPT | Mod: CPTII,S$GLB,, | Performed by: INTERNAL MEDICINE

## 2024-10-22 PROCEDURE — 3074F SYST BP LT 130 MM HG: CPT | Mod: CPTII,S$GLB,, | Performed by: INTERNAL MEDICINE

## 2024-10-22 RX ORDER — CODEINE PHOSPHATE AND GUAIFENESIN 10; 100 MG/5ML; MG/5ML
SOLUTION ORAL
Qty: 120 ML | Refills: 0 | Status: SHIPPED | OUTPATIENT
Start: 2024-10-22

## 2024-10-22 NOTE — PROGRESS NOTES
Patient ID: Emilio Gallagher is a 29 y.o. male.    Chief Complaint: Hospital Follow Up and Establish Care    History of Present Illness    CHIEF COMPLAINT:  Emilio presents today for hospital follow-up after pneumonia.    RECENT HOSPITALIZATION FOR PNEUMONIA:  He initially visited urgent care on October 9, 2024, with chest congestion, cough, fever of 102°F, loss of taste for 2 days, and shortness of breath. He tested negative for COVID and influenza. He was prescribed Augmentin and an albuterol inhaler. A chest XR revealed a left lower lobe interstitial infiltrate. On October 11th, he visited the ER due to sepsis from pneumonia. A CT showed bilateral patchy ground glass opacities. He required up to 3 L of oxygen due to hypoxic respiratory failure. He again tested negative for COVID, flu, and RSV. He was started on azithromycin and ceftriaxone. A CTA was negative for pulmonary embolism, and bilateral lower extremity DVT studies were negative for blood clots. He responded well to supportive care, with resolution of oxygen requirements and successful completion of a 6-minute walk test prior to discharge. He has a few remaining doses of antibiotics to complete.    CURRENT SYMPTOMS:  He reports a persistent dry cough, worse at night when lying on his side. He denies fevers or chills. He reports no significant wheezing or shortness of breath. He acknowledges improving fatigue.    MEDICATIONS:  Current medications include Tessalon pearls for cough, Mucinex, Singulair (Montelukast), and an Albuterol inhaler. He reports having enough Tessalon pearls. He is unsure why he was prescribed Singulair but acknowledges it was given for asthma-like symptoms. He denies a history of asthma.    SOCIAL HISTORY:  He quit smoking during his recent illness with pneumonia and has not resumed. He does not plan to restart.         Physical Exam    General: No acute distress. Well-developed. Well-nourished.  Eyes: EOMI. Sclerae  anicteric.  HENT: Normocephalic. Atraumatic. Nares patent. Moist oral mucosa.  Ears: Bilateral TMs clear. Bilateral EACs clear.  Cardiovascular: Regular rate. Regular rhythm. No murmurs. No rubs. No gallops. Normal S1, S2.  Respiratory: Normal respiratory effort. Clear to auscultation bilaterally. No rales. No rhonchi. No wheezing. Trace crackles at left lower lung base.  Abdomen: Soft. Non-tender. Non-distended. Normoactive bowel sounds.  Musculoskeletal: No  obvious deformity.  Extremities: No lower extremity edema.  Neurological: Alert & oriented x3. No slurred speech. Normal gait.  Psychiatric: Normal mood. Normal affect. Good insight. Good judgment.  Skin: Warm. Dry. No rash.         Assessment & Plan    Assessed patient's recovery from left lower lobe pneumonia, noting clinical improvement with resolution of fever, chills, and most respiratory symptoms  Evaluated persistent dry cough, particularly at night, and decided on additional symptomatic treatment  Considered patient's recent smoking cessation in context of pneumonia recovery and future health maintenance  Noted mild anemia with microcytosis from recent lab results, but deferred immediate intervention    PNEUMONIA:  - Explained the expected recovery timeline for pneumonia, indicating symptoms may persist for up to 6 weeks.  - Chest XR ordered in 3 wks to confirm radiologic resolution of pneumonia.    SMOKING CESSATION:  - Discussed the importance of continued smoking cessation for overall health and recovery.  - Emilio to continue to abstain from smoking.  - Emilio to attend virtual appointment with [facility] smoking cessation program.    COUGH:  - Started Cheratussin AC 5 to 10 mL p.o. q.h.s. p.r.n. cough  - instructed to start with 5 mL and increase to 10 mL if needed.  - Continued Tessalon pearls as needed for cough.  - Continued Mucinex as needed for cough.    MEDICATIONS/SUPPLEMENTS:  - Discontinued Montelukast (Singulair).  - Discontinued albuterol  inhaler.    FOLLOW UP:  - Follow up in 1 month for physical exam.  - discussion of preventive care such as pneumonia vaccination.            Over 1/2 of 40 minute visit spent reviewing pt's medical records, education/discussion of pt's medical conditions and medical management    This note was generated with the assistance of ambient listening technology. Verbal consent was obtained by the patient and accompanying visitor(s) for the recording of patient appointment to facilitate this note. I attest to having reviewed and edited the generated note for accuracy, though some syntax or spelling errors may persist. Please contact the author of this note for any clarification.

## 2024-10-28 ENCOUNTER — TELEPHONE (OUTPATIENT)
Dept: SMOKING CESSATION | Facility: CLINIC | Age: 29
End: 2024-10-28
Payer: COMMERCIAL

## 2025-06-18 ENCOUNTER — OFFICE VISIT (OUTPATIENT)
Dept: INTERNAL MEDICINE | Facility: CLINIC | Age: 30
End: 2025-06-18
Payer: COMMERCIAL

## 2025-06-18 ENCOUNTER — LAB VISIT (OUTPATIENT)
Dept: LAB | Facility: HOSPITAL | Age: 30
End: 2025-06-18
Attending: INTERNAL MEDICINE
Payer: COMMERCIAL

## 2025-06-18 ENCOUNTER — TELEPHONE (OUTPATIENT)
Dept: INTERNAL MEDICINE | Facility: CLINIC | Age: 30
End: 2025-06-18

## 2025-06-18 VITALS
BODY MASS INDEX: 32.3 KG/M2 | DIASTOLIC BLOOD PRESSURE: 76 MMHG | HEIGHT: 69 IN | HEART RATE: 63 BPM | RESPIRATION RATE: 18 BRPM | WEIGHT: 218.06 LBS | TEMPERATURE: 98 F | SYSTOLIC BLOOD PRESSURE: 114 MMHG | OXYGEN SATURATION: 99 %

## 2025-06-18 DIAGNOSIS — F17.200 TOBACCO DEPENDENCY: ICD-10-CM

## 2025-06-18 DIAGNOSIS — D50.9 IRON DEFICIENCY ANEMIA, UNSPECIFIED IRON DEFICIENCY ANEMIA TYPE: ICD-10-CM

## 2025-06-18 DIAGNOSIS — D50.9 MICROCYTIC ANEMIA: ICD-10-CM

## 2025-06-18 DIAGNOSIS — E66.9 OBESITY (BMI 30-39.9): ICD-10-CM

## 2025-06-18 DIAGNOSIS — Z00.00 ANNUAL PHYSICAL EXAM: ICD-10-CM

## 2025-06-18 DIAGNOSIS — Z00.00 ANNUAL PHYSICAL EXAM: Primary | ICD-10-CM

## 2025-06-18 DIAGNOSIS — Z87.01 HISTORY OF PNEUMONIA: ICD-10-CM

## 2025-06-18 PROBLEM — Z09 HOSPITAL DISCHARGE FOLLOW-UP: Status: RESOLVED | Noted: 2024-10-14 | Resolved: 2025-06-18

## 2025-06-18 PROBLEM — A41.9 SEPSIS: Status: RESOLVED | Noted: 2024-10-11 | Resolved: 2025-06-18

## 2025-06-18 LAB
ABSOLUTE EOSINOPHIL (OHS): 0 K/UL
ABSOLUTE MONOCYTE (OHS): 0.51 K/UL (ref 0.3–1)
ABSOLUTE NEUTROPHIL COUNT (OHS): 4.53 K/UL (ref 1.8–7.7)
ALBUMIN SERPL BCP-MCNC: 4.6 G/DL (ref 3.5–5.2)
ALP SERPL-CCNC: 55 UNIT/L (ref 40–150)
ALT SERPL W/O P-5'-P-CCNC: 53 UNIT/L (ref 10–44)
ANION GAP (OHS): 7 MMOL/L (ref 8–16)
AST SERPL-CCNC: 25 UNIT/L (ref 11–45)
BASOPHILS # BLD AUTO: 0.1 K/UL
BASOPHILS NFR BLD AUTO: 1.4 %
BILIRUB SERPL-MCNC: 0.7 MG/DL (ref 0.1–1)
BUN SERPL-MCNC: 11 MG/DL (ref 6–20)
CALCIUM SERPL-MCNC: 9.3 MG/DL (ref 8.7–10.5)
CHLORIDE SERPL-SCNC: 108 MMOL/L (ref 95–110)
CHOLEST SERPL-MCNC: 177 MG/DL (ref 120–199)
CHOLEST/HDLC SERPL: 5.4 {RATIO} (ref 2–5)
CO2 SERPL-SCNC: 25 MMOL/L (ref 23–29)
CREAT SERPL-MCNC: 1 MG/DL (ref 0.5–1.4)
EAG (OHS): 105 MG/DL (ref 68–131)
ERYTHROCYTE [DISTWIDTH] IN BLOOD BY AUTOMATED COUNT: 17.9 % (ref 11.5–14.5)
FERRITIN SERPL-MCNC: 459 NG/ML (ref 20–300)
GFR SERPLBLD CREATININE-BSD FMLA CKD-EPI: >60 ML/MIN/1.73/M2
GLUCOSE SERPL-MCNC: 98 MG/DL (ref 70–110)
HBA1C MFR BLD: 5.3 % (ref 4–5.6)
HCT VFR BLD AUTO: 43.2 % (ref 40–54)
HDLC SERPL-MCNC: 33 MG/DL (ref 40–75)
HDLC SERPL: 18.6 % (ref 20–50)
HGB BLD-MCNC: 12.9 GM/DL (ref 14–18)
IMM GRANULOCYTES # BLD AUTO: 0.02 K/UL (ref 0–0.04)
IMM GRANULOCYTES NFR BLD AUTO: 0.3 % (ref 0–0.5)
IRON SATN MFR SERPL: 36 % (ref 20–50)
IRON SERPL-MCNC: 90 UG/DL (ref 45–160)
LDLC SERPL CALC-MCNC: 114.2 MG/DL (ref 63–159)
LYMPHOCYTES # BLD AUTO: 2.01 K/UL (ref 1–4.8)
MCH RBC QN AUTO: 18.9 PG (ref 27–31)
MCHC RBC AUTO-ENTMCNC: 29.9 G/DL (ref 32–36)
MCV RBC AUTO: 63 FL (ref 82–98)
NONHDLC SERPL-MCNC: 144 MG/DL
NUCLEATED RBC (/100WBC) (OHS): 0 /100 WBC
PLATELET # BLD AUTO: 261 K/UL (ref 150–450)
PMV BLD AUTO: 10.1 FL (ref 9.2–12.9)
POTASSIUM SERPL-SCNC: 4.2 MMOL/L (ref 3.5–5.1)
PROT SERPL-MCNC: 7.3 GM/DL (ref 6–8.4)
RBC # BLD AUTO: 6.81 M/UL (ref 4.6–6.2)
RELATIVE EOSINOPHIL (OHS): 0 %
RELATIVE LYMPHOCYTE (OHS): 28 % (ref 18–48)
RELATIVE MONOCYTE (OHS): 7.1 % (ref 4–15)
RELATIVE NEUTROPHIL (OHS): 63.2 % (ref 38–73)
RETICS/RBC NFR AUTO: 2.7 % (ref 0.4–2)
SODIUM SERPL-SCNC: 140 MMOL/L (ref 136–145)
TESTOST SERPL-MCNC: 471 NG/DL (ref 304–1227)
TIBC SERPL-MCNC: 252 UG/DL (ref 250–450)
TRANSFERRIN SERPL-MCNC: 170 MG/DL (ref 200–375)
TRIGL SERPL-MCNC: 149 MG/DL (ref 30–150)
TSH SERPL-ACNC: 1.69 UIU/ML (ref 0.4–4)
WBC # BLD AUTO: 7.17 K/UL (ref 3.9–12.7)

## 2025-06-18 PROCEDURE — 83036 HEMOGLOBIN GLYCOSYLATED A1C: CPT

## 2025-06-18 PROCEDURE — 85025 COMPLETE CBC W/AUTO DIFF WBC: CPT

## 2025-06-18 PROCEDURE — 84443 ASSAY THYROID STIM HORMONE: CPT

## 2025-06-18 PROCEDURE — 82728 ASSAY OF FERRITIN: CPT | Mod: 59

## 2025-06-18 PROCEDURE — 80061 LIPID PANEL: CPT

## 2025-06-18 PROCEDURE — 3008F BODY MASS INDEX DOCD: CPT | Mod: CPTII,S$GLB,, | Performed by: INTERNAL MEDICINE

## 2025-06-18 PROCEDURE — 3078F DIAST BP <80 MM HG: CPT | Mod: CPTII,S$GLB,, | Performed by: INTERNAL MEDICINE

## 2025-06-18 PROCEDURE — 80053 COMPREHEN METABOLIC PANEL: CPT

## 2025-06-18 PROCEDURE — 84403 ASSAY OF TOTAL TESTOSTERONE: CPT

## 2025-06-18 PROCEDURE — 90472 IMMUNIZATION ADMIN EACH ADD: CPT | Mod: S$GLB,,, | Performed by: INTERNAL MEDICINE

## 2025-06-18 PROCEDURE — 90471 IMMUNIZATION ADMIN: CPT | Mod: S$GLB,,, | Performed by: INTERNAL MEDICINE

## 2025-06-18 PROCEDURE — 1160F RVW MEDS BY RX/DR IN RCRD: CPT | Mod: CPTII,S$GLB,, | Performed by: INTERNAL MEDICINE

## 2025-06-18 PROCEDURE — 90677 PCV20 VACCINE IM: CPT | Mod: S$GLB,,, | Performed by: INTERNAL MEDICINE

## 2025-06-18 PROCEDURE — 83021 HEMOGLOBIN CHROMOTOGRAPHY: CPT

## 2025-06-18 PROCEDURE — 83540 ASSAY OF IRON: CPT

## 2025-06-18 PROCEDURE — 3074F SYST BP LT 130 MM HG: CPT | Mod: CPTII,S$GLB,, | Performed by: INTERNAL MEDICINE

## 2025-06-18 PROCEDURE — 99395 PREV VISIT EST AGE 18-39: CPT | Mod: 25,S$GLB,, | Performed by: INTERNAL MEDICINE

## 2025-06-18 PROCEDURE — 90715 TDAP VACCINE 7 YRS/> IM: CPT | Mod: S$GLB,,, | Performed by: INTERNAL MEDICINE

## 2025-06-18 PROCEDURE — 36415 COLL VENOUS BLD VENIPUNCTURE: CPT | Mod: PO

## 2025-06-18 PROCEDURE — 99999 PR PBB SHADOW E&M-EST. PATIENT-LVL III: CPT | Mod: PBBFAC,,, | Performed by: INTERNAL MEDICINE

## 2025-06-18 PROCEDURE — 85045 AUTOMATED RETICULOCYTE COUNT: CPT

## 2025-06-18 PROCEDURE — 1159F MED LIST DOCD IN RCRD: CPT | Mod: CPTII,S$GLB,, | Performed by: INTERNAL MEDICINE

## 2025-06-18 NOTE — TELEPHONE ENCOUNTER
Call from lab -   Thalasseima and Hemoglobinopathy Eval there was an issue collecting it, and they need pt to come back to lab sometime this week.   LVM for pt informing he needs to come back sometime this week

## 2025-06-18 NOTE — PROGRESS NOTES
Subjective     Patient ID: Emilio Gallagher is a 30 y.o. male.    Chief Complaint: Annual Exam    HPI  30 y.o. Male here for annual exam.     Vaccines: Influenza (); Tetanus (); Prevnar 20 ()  Eye exam: declined    Exercise: walks  Diet: regular     Past Medical History:  No date: History of tobacco use  No date: THOMPSON (iron deficiency anemia)  No date: Obesity, unspecified  Past Surgical History:  No date: APPENDECTOMY  Social History    Socioeconomic History      Marital status:       Number of children: 2    Tobacco Use      Smoking status: Former        Packs/day: 0.00        Years: 0.5 packs/day for 8.0 years (4.0 ttl pk-yrs)        Types: Cigarettes        Start date: 10/22/2016        Quit date: 10/11/2024        Years since quittin.6        Passive exposure: Past      Smokeless tobacco: Never    Substance and Sexual Activity      Alcohol use: No      Drug use: Never      Sexual activity: Yes        Partners: Female        Birth control/protection: None    Social History Narrative           Social Drivers of Health  Financial Resource Strain: Low Risk  (10/14/2024)      Overall Financial Resource Strain (CARDIA)          Difficulty of Paying Living Expenses: Not very hard  Food Insecurity: No Food Insecurity (10/14/2024)      Hunger Vital Sign          Worried About Running Out of Food in the Last Year: Never true          Ran Out of Food in the Last Year: Never true  Transportation Needs: Patient Declined (10/14/2024)      TRANSPORTATION NEEDS          Transportation : Patient declined  Physical Activity: Patient Declined (10/14/2024)      Exercise Vital Sign          Days of Exercise per Week: Patient declined          Minutes of Exercise per Session: Patient declined  Stress: No Stress Concern Present (10/14/2024)      [employer] - Occupational Stress Questionnaire          Feeling of Stress : Not at all  Housing Stability: Unknown  (10/14/2024)      Housing Stability Vital Sign          Unable to Pay for Housing in the Last Year: No          Homeless in the Last Year: No  Review of patient's allergies indicates:  No Known Allergies  Emilio Gallagher had no medications administered during this visit.  Review of Systems   Constitutional:  Negative for activity change, appetite change, chills, diaphoresis, fatigue, fever and unexpected weight change.   HENT:  Negative for nasal congestion, mouth sores, postnasal drip, rhinorrhea, sinus pressure/congestion, sneezing, sore throat, trouble swallowing and voice change.    Eyes:  Negative for discharge, itching and visual disturbance.   Respiratory:  Negative for cough, chest tightness, shortness of breath and wheezing.    Cardiovascular:  Negative for chest pain, palpitations and leg swelling.   Gastrointestinal:  Negative for abdominal pain, blood in stool, constipation, diarrhea, nausea and vomiting.   Endocrine: Negative for cold intolerance and heat intolerance.   Genitourinary:  Negative for difficulty urinating, dysuria, flank pain, hematuria and urgency.   Musculoskeletal:  Negative for arthralgias, back pain, myalgias and neck pain.   Integumentary:  Negative for rash and wound.   Allergic/Immunologic: Negative for environmental allergies and food allergies.   Neurological:  Negative for dizziness, tremors, seizures, syncope, weakness and headaches.   Hematological:  Negative for adenopathy. Does not bruise/bleed easily.   Psychiatric/Behavioral:  Negative for confusion, sleep disturbance and suicidal ideas. The patient is not nervous/anxious.           Objective     Physical Exam  Constitutional:       General: He is not in acute distress.     Appearance: Normal appearance. He is well-developed. He is not ill-appearing, toxic-appearing or diaphoretic.   HENT:      Head: Normocephalic and atraumatic.      Right Ear: External ear normal.      Left Ear: External ear normal.      Nose: Nose  normal.      Mouth/Throat:      Pharynx: No oropharyngeal exudate.   Eyes:      General: No scleral icterus.        Right eye: No discharge.         Left eye: No discharge.      Extraocular Movements: Extraocular movements intact.      Conjunctiva/sclera: Conjunctivae normal.      Pupils: Pupils are equal, round, and reactive to light.   Neck:      Thyroid: No thyromegaly.      Vascular: No JVD.   Cardiovascular:      Rate and Rhythm: Normal rate and regular rhythm.      Pulses: Normal pulses.      Heart sounds: Normal heart sounds. No murmur heard.  Pulmonary:      Effort: Pulmonary effort is normal. No respiratory distress.      Breath sounds: Normal breath sounds. No wheezing or rales.   Abdominal:      General: Bowel sounds are normal. There is no distension.      Palpations: Abdomen is soft.      Tenderness: There is no abdominal tenderness. There is no right CVA tenderness, left CVA tenderness, guarding or rebound.   Musculoskeletal:      Cervical back: Normal range of motion and neck supple. No rigidity.      Right lower leg: No edema.      Left lower leg: No edema.   Lymphadenopathy:      Cervical: No cervical adenopathy.   Skin:     General: Skin is warm and dry.      Capillary Refill: Capillary refill takes less than 2 seconds.      Coloration: Skin is not pale.      Findings: No rash.   Neurological:      General: No focal deficit present.      Mental Status: He is alert and oriented to person, place, and time. Mental status is at baseline.      Cranial Nerves: No cranial nerve deficit.      Sensory: No sensory deficit.      Motor: No weakness.      Coordination: Coordination normal.      Gait: Gait normal.      Deep Tendon Reflexes: Reflexes normal.   Psychiatric:         Mood and Affect: Mood normal.         Behavior: Behavior normal.         Thought Content: Thought content normal.         Judgment: Judgment normal.            Assessment and Plan     1. Annual physical exam  -     Testosterone,Total;  Future; Expected date: 06/18/2025  -     DIPH,PERTUSS(ACEL),TET VAC(PF)(ADULT)(ADACEL)(TDaP)    2. Tobacco dependency    3. History of pneumonia  -     pneumoc 20-irene conj-dip cr(PF) (PREVNAR-20 (PF)) injection Syrg 0.5 mL    4. Obesity (BMI 30-39.9)    5. Iron deficiency anemia, unspecified iron deficiency anemia type    6. Microcytic anemia  -     CBC Auto Differential; Future; Expected date: 06/18/2025  -     Ferritin; Future; Expected date: 06/18/2025  -     Reticulocytes; Future; Expected date: 06/18/2025  -     Iron and TIBC; Future; Expected date: 06/18/2025  -     Thalasseima and Hemoglobinopathy Eval; Future; Expected date: 06/18/2025        Blood work ordered    THOMPSON- repeat levels and with thalassemia panel    Obesity- stable    Hx of PNA- Prevnar 20 given today    Hx of tobacco use    F/u in 1 yr

## 2025-06-19 ENCOUNTER — PATIENT MESSAGE (OUTPATIENT)
Dept: HEMATOLOGY/ONCOLOGY | Facility: CLINIC | Age: 30
End: 2025-06-19
Payer: COMMERCIAL

## 2025-06-19 ENCOUNTER — PATIENT MESSAGE (OUTPATIENT)
Dept: INTERNAL MEDICINE | Facility: CLINIC | Age: 30
End: 2025-06-19
Payer: COMMERCIAL

## 2025-07-08 ENCOUNTER — PATIENT MESSAGE (OUTPATIENT)
Dept: INTERNAL MEDICINE | Facility: CLINIC | Age: 30
End: 2025-07-08
Payer: COMMERCIAL

## 2025-07-08 DIAGNOSIS — Z72.0 TOBACCO USE: Primary | ICD-10-CM

## 2025-07-09 NOTE — TELEPHONE ENCOUNTER
Pt requesting refill on Nicoderm prescribed by another provider, inactive on med list 6/18/2025    LOV with Jason Zepeda DO , 6/18/2025

## 2025-07-11 RX ORDER — IBUPROFEN 200 MG
1 TABLET ORAL DAILY
Qty: 28 PATCH | Refills: 1 | Status: SHIPPED | OUTPATIENT
Start: 2025-07-11

## 2025-08-09 ENCOUNTER — HOSPITAL ENCOUNTER (EMERGENCY)
Facility: HOSPITAL | Age: 30
Discharge: HOME | End: 2025-08-09
Attending: FAMILY MEDICINE
Payer: COMMERCIAL

## 2025-08-09 VITALS
TEMPERATURE: 98 F | OXYGEN SATURATION: 100 % | SYSTOLIC BLOOD PRESSURE: 153 MMHG | RESPIRATION RATE: 16 BRPM | BODY MASS INDEX: 25.73 KG/M2 | WEIGHT: 190 LBS | HEIGHT: 72 IN | HEART RATE: 92 BPM | DIASTOLIC BLOOD PRESSURE: 89 MMHG

## 2025-08-09 DIAGNOSIS — Z72.0 TOBACCO ABUSE: ICD-10-CM

## 2025-08-09 DIAGNOSIS — E86.0 DEHYDRATION: ICD-10-CM

## 2025-08-09 DIAGNOSIS — R00.2 PALPITATIONS: Primary | ICD-10-CM

## 2025-08-09 LAB
ABSOLUTE EOSINOPHIL (OHS): 0.02 K/UL
ABSOLUTE MONOCYTE (OHS): 0.51 K/UL (ref 0.3–1)
ABSOLUTE NEUTROPHIL COUNT (OHS): 5.45 K/UL (ref 1.8–7.7)
ALBUMIN SERPL BCP-MCNC: 4.7 G/DL (ref 3.5–5.2)
ALP SERPL-CCNC: 53 UNIT/L (ref 38–126)
ALT SERPL W/O P-5'-P-CCNC: 39 UNIT/L (ref 10–44)
ANION GAP (OHS): 10 MMOL/L (ref 8–16)
AST SERPL-CCNC: 26 UNIT/L (ref 15–46)
BASOPHILS # BLD AUTO: 0.11 K/UL
BASOPHILS NFR BLD AUTO: 1.3 %
BILIRUB SERPL-MCNC: 0.9 MG/DL (ref 0.1–1)
BUN SERPL-MCNC: 17 MG/DL (ref 2–20)
CALCIUM SERPL-MCNC: 9.1 MG/DL (ref 8.7–10.5)
CHLORIDE SERPL-SCNC: 103 MMOL/L (ref 95–110)
CK SERPL-CCNC: 160 U/L (ref 55–170)
CO2 SERPL-SCNC: 25 MMOL/L (ref 23–29)
CREAT SERPL-MCNC: 1.5 MG/DL (ref 0.5–1.4)
ERYTHROCYTE [DISTWIDTH] IN BLOOD BY AUTOMATED COUNT: 17.9 % (ref 11.5–14.5)
GFR SERPLBLD CREATININE-BSD FMLA CKD-EPI: >60 ML/MIN/1.73/M2
GLUCOSE SERPL-MCNC: 104 MG/DL (ref 70–110)
HCT VFR BLD AUTO: 42.5 % (ref 40–54)
HGB BLD-MCNC: 13.5 GM/DL (ref 14–18)
IMM GRANULOCYTES # BLD AUTO: 0.03 K/UL (ref 0–0.04)
IMM GRANULOCYTES NFR BLD AUTO: 0.4 % (ref 0–0.5)
LYMPHOCYTES # BLD AUTO: 2.12 K/UL (ref 1–4.8)
MCH RBC QN AUTO: 19.7 PG (ref 27–31)
MCHC RBC AUTO-ENTMCNC: 31.8 G/DL (ref 32–36)
MCV RBC AUTO: 62 FL (ref 82–98)
NT-PROBNP SERPL-MCNC: <20 PG/ML (ref 5–450)
NUCLEATED RBC (/100WBC) (OHS): 0 /100 WBC
PLATELET # BLD AUTO: 271 K/UL (ref 150–450)
PMV BLD AUTO: 9.4 FL (ref 9.2–12.9)
POTASSIUM SERPL-SCNC: 3.6 MMOL/L (ref 3.5–5.1)
PROT SERPL-MCNC: 7.8 GM/DL (ref 6–8.4)
RBC # BLD AUTO: 6.87 M/UL (ref 4.6–6.2)
RELATIVE EOSINOPHIL (OHS): 0.2 %
RELATIVE LYMPHOCYTE (OHS): 25.7 % (ref 18–48)
RELATIVE MONOCYTE (OHS): 6.2 % (ref 4–15)
RELATIVE NEUTROPHIL (OHS): 66.2 % (ref 38–73)
SODIUM SERPL-SCNC: 138 MMOL/L (ref 136–145)
TROPONIN I SERPL DL<=0.01 NG/ML-MCNC: <0.012 NG/ML (ref 0–0.03)
WBC # BLD AUTO: 8.24 K/UL (ref 3.9–12.7)

## 2025-08-09 PROCEDURE — 93005 ELECTROCARDIOGRAM TRACING: CPT | Mod: ER

## 2025-08-09 PROCEDURE — 94761 N-INVAS EAR/PLS OXIMETRY MLT: CPT | Mod: ER

## 2025-08-09 PROCEDURE — 25000003 PHARM REV CODE 250: Mod: ER | Performed by: EMERGENCY MEDICINE

## 2025-08-09 PROCEDURE — 85025 COMPLETE CBC W/AUTO DIFF WBC: CPT | Mod: ER | Performed by: FAMILY MEDICINE

## 2025-08-09 PROCEDURE — 99900035 HC TECH TIME PER 15 MIN (STAT): Mod: ER

## 2025-08-09 PROCEDURE — 82550 ASSAY OF CK (CPK): CPT | Mod: ER | Performed by: EMERGENCY MEDICINE

## 2025-08-09 PROCEDURE — 84484 ASSAY OF TROPONIN QUANT: CPT | Mod: ER | Performed by: FAMILY MEDICINE

## 2025-08-09 PROCEDURE — 99285 EMERGENCY DEPT VISIT HI MDM: CPT | Mod: 25,ER

## 2025-08-09 PROCEDURE — 25000003 PHARM REV CODE 250: Mod: ER | Performed by: FAMILY MEDICINE

## 2025-08-09 PROCEDURE — 96360 HYDRATION IV INFUSION INIT: CPT | Mod: ER

## 2025-08-09 PROCEDURE — 83880 ASSAY OF NATRIURETIC PEPTIDE: CPT | Mod: ER | Performed by: FAMILY MEDICINE

## 2025-08-09 PROCEDURE — 93010 ELECTROCARDIOGRAM REPORT: CPT | Mod: ,,, | Performed by: STUDENT IN AN ORGANIZED HEALTH CARE EDUCATION/TRAINING PROGRAM

## 2025-08-09 PROCEDURE — 80053 COMPREHEN METABOLIC PANEL: CPT | Mod: ER | Performed by: FAMILY MEDICINE

## 2025-08-09 RX ORDER — ALUMINUM HYDROXIDE, MAGNESIUM HYDROXIDE, AND SIMETHICONE 1200; 120; 1200 MG/30ML; MG/30ML; MG/30ML
30 SUSPENSION ORAL ONCE
Status: COMPLETED | OUTPATIENT
Start: 2025-08-09 | End: 2025-08-09

## 2025-08-09 RX ORDER — LIDOCAINE HYDROCHLORIDE 20 MG/ML
15 SOLUTION OROPHARYNGEAL ONCE
Status: COMPLETED | OUTPATIENT
Start: 2025-08-09 | End: 2025-08-09

## 2025-08-09 RX ADMIN — LIDOCAINE HYDROCHLORIDE 15 ML: 20 SOLUTION ORAL at 06:08

## 2025-08-09 RX ADMIN — SODIUM CHLORIDE 1000 ML: 9 INJECTION, SOLUTION INTRAVENOUS at 07:08

## 2025-08-09 RX ADMIN — ALUMINUM HYDROXIDE, MAGNESIUM HYDROXIDE, AND SIMETHICONE 30 ML: 200; 200; 20 SUSPENSION ORAL at 06:08
